# Patient Record
Sex: FEMALE | Race: WHITE | Employment: FULL TIME | URBAN - METROPOLITAN AREA
[De-identification: names, ages, dates, MRNs, and addresses within clinical notes are randomized per-mention and may not be internally consistent; named-entity substitution may affect disease eponyms.]

---

## 2019-07-09 ENCOUNTER — HOSPITAL ENCOUNTER (OUTPATIENT)
Dept: PREADMISSION TESTING | Age: 44
Discharge: HOME | End: 2019-07-09
Payer: COMMERCIAL

## 2019-07-09 ENCOUNTER — HOSPITAL ENCOUNTER (OUTPATIENT)
Dept: GENERAL RADIOLOGY | Age: 44
Discharge: HOME | End: 2019-07-09
Attending: OTOLARYNGOLOGY
Payer: COMMERCIAL

## 2019-07-09 VITALS
WEIGHT: 274.5 LBS | RESPIRATION RATE: 20 BRPM | DIASTOLIC BLOOD PRESSURE: 70 MMHG | SYSTOLIC BLOOD PRESSURE: 128 MMHG | HEART RATE: 90 BPM | HEIGHT: 68 IN | BODY MASS INDEX: 41.6 KG/M2 | TEMPERATURE: 98 F

## 2019-07-09 DIAGNOSIS — E11.65 UNCONTROLLED TYPE 2 DIABETES MELLITUS WITH HYPERGLYCEMIA (HCC): ICD-10-CM

## 2019-07-09 LAB
ALBUMIN SERPL-MCNC: 3.2 G/DL (ref 3.5–5)
ALBUMIN/GLOB SERPL: 1.3 {RATIO} (ref 1.1–2.2)
ALP SERPL-CCNC: 119 U/L (ref 45–117)
ALT SERPL-CCNC: 17 U/L (ref 12–78)
ANION GAP SERPL CALC-SCNC: 9 MMOL/L (ref 5–15)
APPEARANCE UR: CLEAR
AST SERPL-CCNC: 7 U/L (ref 15–37)
ATRIAL RATE: 86 BPM
BACTERIA URNS QL MICRO: NEGATIVE /HPF
BASOPHILS # BLD: 0.1 K/UL (ref 0–0.1)
BASOPHILS NFR BLD: 1 % (ref 0–1)
BILIRUB SERPL-MCNC: 0.3 MG/DL (ref 0.2–1)
BILIRUB UR QL: NEGATIVE
BUN SERPL-MCNC: 15 MG/DL (ref 6–20)
BUN/CREAT SERPL: 14 (ref 12–20)
CALCIUM SERPL-MCNC: 8.2 MG/DL (ref 8.5–10.1)
CALCULATED P AXIS, ECG09: 61 DEGREES
CALCULATED R AXIS, ECG10: -27 DEGREES
CALCULATED T AXIS, ECG11: 4 DEGREES
CHLORIDE SERPL-SCNC: 105 MMOL/L (ref 97–108)
CO2 SERPL-SCNC: 23 MMOL/L (ref 21–32)
COLOR UR: ABNORMAL
CREAT SERPL-MCNC: 1.08 MG/DL (ref 0.55–1.02)
DIAGNOSIS, 93000: NORMAL
DIFFERENTIAL METHOD BLD: ABNORMAL
EOSINOPHIL # BLD: 0.2 K/UL (ref 0–0.4)
EOSINOPHIL NFR BLD: 4 % (ref 0–7)
EPITH CASTS URNS QL MICRO: ABNORMAL /LPF
ERYTHROCYTE [DISTWIDTH] IN BLOOD BY AUTOMATED COUNT: 15.4 % (ref 11.5–14.5)
EST. AVERAGE GLUCOSE BLD GHB EST-MCNC: 292 MG/DL
GLOBULIN SER CALC-MCNC: 2.5 G/DL (ref 2–4)
GLUCOSE SERPL-MCNC: 389 MG/DL (ref 65–100)
GLUCOSE UR STRIP.AUTO-MCNC: >1000 MG/DL
HBA1C MFR BLD: 11.8 % (ref 4.2–6.3)
HCT VFR BLD AUTO: 37.7 % (ref 35–47)
HGB BLD-MCNC: 11.4 G/DL (ref 11.5–16)
HGB UR QL STRIP: NEGATIVE
HYALINE CASTS URNS QL MICRO: ABNORMAL /LPF (ref 0–5)
IMM GRANULOCYTES # BLD AUTO: 0.1 K/UL (ref 0–0.04)
IMM GRANULOCYTES NFR BLD AUTO: 1 % (ref 0–0.5)
KETONES UR QL STRIP.AUTO: NEGATIVE MG/DL
LEUKOCYTE ESTERASE UR QL STRIP.AUTO: NEGATIVE
LYMPHOCYTES # BLD: 1.2 K/UL (ref 0.8–3.5)
LYMPHOCYTES NFR BLD: 23 % (ref 12–49)
MCH RBC QN AUTO: 21.6 PG (ref 26–34)
MCHC RBC AUTO-ENTMCNC: 30.2 G/DL (ref 30–36.5)
MCV RBC AUTO: 71.5 FL (ref 80–99)
MONOCYTES # BLD: 0.5 K/UL (ref 0–1)
MONOCYTES NFR BLD: 9 % (ref 5–13)
NEUTS SEG # BLD: 3.1 K/UL (ref 1.8–8)
NEUTS SEG NFR BLD: 62 % (ref 32–75)
NITRITE UR QL STRIP.AUTO: NEGATIVE
NRBC # BLD: 0 K/UL (ref 0–0.01)
NRBC BLD-RTO: 0 PER 100 WBC
P-R INTERVAL, ECG05: 162 MS
PH UR STRIP: 6 [PH] (ref 5–8)
PLATELET # BLD AUTO: 203 K/UL (ref 150–400)
PMV BLD AUTO: 11.2 FL (ref 8.9–12.9)
POTASSIUM SERPL-SCNC: 4 MMOL/L (ref 3.5–5.1)
PROT SERPL-MCNC: 5.7 G/DL (ref 6.4–8.2)
PROT UR STRIP-MCNC: NEGATIVE MG/DL
Q-T INTERVAL, ECG07: 390 MS
QRS DURATION, ECG06: 90 MS
QTC CALCULATION (BEZET), ECG08: 466 MS
RBC # BLD AUTO: 5.27 M/UL (ref 3.8–5.2)
RBC #/AREA URNS HPF: ABNORMAL /HPF (ref 0–5)
SODIUM SERPL-SCNC: 137 MMOL/L (ref 136–145)
SP GR UR REFRACTOMETRY: 1.01 (ref 1–1.03)
UA: UC IF INDICATED,UAUC: ABNORMAL
UROBILINOGEN UR QL STRIP.AUTO: 0.2 EU/DL (ref 0.2–1)
VENTRICULAR RATE, ECG03: 86 BPM
WBC # BLD AUTO: 5.1 K/UL (ref 3.6–11)
WBC URNS QL MICRO: ABNORMAL /HPF (ref 0–4)

## 2019-07-09 PROCEDURE — 93005 ELECTROCARDIOGRAM TRACING: CPT

## 2019-07-09 PROCEDURE — 83036 HEMOGLOBIN GLYCOSYLATED A1C: CPT

## 2019-07-09 PROCEDURE — 85025 COMPLETE CBC W/AUTO DIFF WBC: CPT

## 2019-07-09 PROCEDURE — 36415 COLL VENOUS BLD VENIPUNCTURE: CPT

## 2019-07-09 PROCEDURE — 81001 URINALYSIS AUTO W/SCOPE: CPT

## 2019-07-09 PROCEDURE — 80053 COMPREHEN METABOLIC PANEL: CPT

## 2019-07-09 PROCEDURE — 71046 X-RAY EXAM CHEST 2 VIEWS: CPT

## 2019-07-09 RX ORDER — ATENOLOL 25 MG/1
25 TABLET ORAL DAILY
COMMUNITY

## 2019-07-09 RX ORDER — FUROSEMIDE 20 MG/1
20 TABLET ORAL DAILY
COMMUNITY

## 2019-07-09 RX ORDER — POTASSIUM CHLORIDE 750 MG/1
10 CAPSULE, EXTENDED RELEASE ORAL DAILY
COMMUNITY

## 2019-07-10 PROBLEM — E11.65 UNCONTROLLED TYPE 2 DIABETES MELLITUS WITH HYPERGLYCEMIA (HCC): Status: ACTIVE | Noted: 2019-07-10

## 2019-07-10 NOTE — ADVANCED PRACTICE NURSE
Patient contacted in regard to A1C 11.8. Patient educated about A1C test and potential complications of uncontrolled blood glucose and surgery, including infection and difficulty healing. Informed also of risk of cancellation of elective surgery and better to have surgery when glucose control has improved. Provided lifestyle modification information. PAT labs routed to PCP and patient informed of need to f/u. Message left with Crystal of Dr Rigoberto Martinez team to inform, per Dr Fe Watson, that patient may be cancelled DOS d/t uncontrolled DM.

## 2019-07-16 ENCOUNTER — ANESTHESIA EVENT (OUTPATIENT)
Dept: MEDSURG UNIT | Age: 44
End: 2019-07-16
Payer: COMMERCIAL

## 2019-07-17 ENCOUNTER — ANESTHESIA (OUTPATIENT)
Dept: MEDSURG UNIT | Age: 44
End: 2019-07-17
Payer: COMMERCIAL

## 2019-07-17 ENCOUNTER — HOSPITAL ENCOUNTER (OUTPATIENT)
Age: 44
Setting detail: OUTPATIENT SURGERY
Discharge: HOME | End: 2019-07-17
Attending: OTOLARYNGOLOGY | Admitting: OTOLARYNGOLOGY
Payer: COMMERCIAL

## 2019-07-17 VITALS
TEMPERATURE: 97.6 F | OXYGEN SATURATION: 99 % | RESPIRATION RATE: 16 BRPM | SYSTOLIC BLOOD PRESSURE: 148 MMHG | HEART RATE: 78 BPM | DIASTOLIC BLOOD PRESSURE: 78 MMHG

## 2019-07-17 DIAGNOSIS — J32.0 CHRONIC MAXILLARY SINUSITIS: Primary | ICD-10-CM

## 2019-07-17 LAB
GLUCOSE BLD STRIP.AUTO-MCNC: 121 MG/DL (ref 65–100)
GLUCOSE BLD STRIP.AUTO-MCNC: 155 MG/DL (ref 65–100)
HCG UR QL: NEGATIVE
SERVICE CMNT-IMP: ABNORMAL
SERVICE CMNT-IMP: ABNORMAL

## 2019-07-17 PROCEDURE — 77030008684 HC TU ET CUF COVD -B: Performed by: ANESTHESIOLOGY

## 2019-07-17 PROCEDURE — 74011000250 HC RX REV CODE- 250

## 2019-07-17 PROCEDURE — 77030032490 HC SLV COMPR SCD KNE COVD -B: Performed by: OTOLARYNGOLOGY

## 2019-07-17 PROCEDURE — 77030018836 HC SOL IRR NACL ICUM -A: Performed by: OTOLARYNGOLOGY

## 2019-07-17 PROCEDURE — 81025 URINE PREGNANCY TEST: CPT

## 2019-07-17 PROCEDURE — 77030028681 HC DRSG NSL ABSRB NASOPORE STRY -C: Performed by: OTOLARYNGOLOGY

## 2019-07-17 PROCEDURE — 74011000250 HC RX REV CODE- 250: Performed by: OTOLARYNGOLOGY

## 2019-07-17 PROCEDURE — 77030002888 HC SUT CHRMC J&J -A: Performed by: OTOLARYNGOLOGY

## 2019-07-17 PROCEDURE — 76060000061 HC AMB SURG ANES 0.5 TO 1 HR: Performed by: OTOLARYNGOLOGY

## 2019-07-17 PROCEDURE — 74011250636 HC RX REV CODE- 250/636

## 2019-07-17 PROCEDURE — 76030000000 HC AMB SURG OR TIME 0.5 TO 1: Performed by: OTOLARYNGOLOGY

## 2019-07-17 PROCEDURE — 76210000037 HC AMBSU PH I REC 2 TO 2.5 HR: Performed by: OTOLARYNGOLOGY

## 2019-07-17 PROCEDURE — 77030021668 HC NEB PREFIL KT VYRM -A

## 2019-07-17 PROCEDURE — 82962 GLUCOSE BLOOD TEST: CPT

## 2019-07-17 PROCEDURE — 77030020256 HC SOL INJ NACL 0.9%  500ML: Performed by: OTOLARYNGOLOGY

## 2019-07-17 PROCEDURE — 76210000046 HC AMBSU PH II REC FIRST 0.5 HR: Performed by: OTOLARYNGOLOGY

## 2019-07-17 PROCEDURE — 77030020782 HC GWN BAIR PAWS FLX 3M -B

## 2019-07-17 PROCEDURE — 74011250637 HC RX REV CODE- 250/637: Performed by: OTOLARYNGOLOGY

## 2019-07-17 PROCEDURE — 74011250636 HC RX REV CODE- 250/636: Performed by: ANESTHESIOLOGY

## 2019-07-17 RX ORDER — FENTANYL CITRATE 50 UG/ML
INJECTION, SOLUTION INTRAMUSCULAR; INTRAVENOUS AS NEEDED
Status: DISCONTINUED | OUTPATIENT
Start: 2019-07-17 | End: 2019-07-17 | Stop reason: HOSPADM

## 2019-07-17 RX ORDER — SODIUM CHLORIDE 0.9 % (FLUSH) 0.9 %
5-40 SYRINGE (ML) INJECTION EVERY 8 HOURS
Status: DISCONTINUED | OUTPATIENT
Start: 2019-07-17 | End: 2019-07-17 | Stop reason: HOSPADM

## 2019-07-17 RX ORDER — OXYMETAZOLINE HCL 0.05 %
2 SPRAY, NON-AEROSOL (ML) NASAL
Status: DISCONTINUED | OUTPATIENT
Start: 2019-07-17 | End: 2019-07-17 | Stop reason: HOSPADM

## 2019-07-17 RX ORDER — FENTANYL CITRATE 50 UG/ML
25 INJECTION, SOLUTION INTRAMUSCULAR; INTRAVENOUS
Status: COMPLETED | OUTPATIENT
Start: 2019-07-17 | End: 2019-07-17

## 2019-07-17 RX ORDER — SODIUM CHLORIDE, SODIUM LACTATE, POTASSIUM CHLORIDE, CALCIUM CHLORIDE 600; 310; 30; 20 MG/100ML; MG/100ML; MG/100ML; MG/100ML
50 INJECTION, SOLUTION INTRAVENOUS CONTINUOUS
Status: DISCONTINUED | OUTPATIENT
Start: 2019-07-17 | End: 2019-07-17 | Stop reason: HOSPADM

## 2019-07-17 RX ORDER — MORPHINE SULFATE 10 MG/ML
2 INJECTION, SOLUTION INTRAMUSCULAR; INTRAVENOUS
Status: DISCONTINUED | OUTPATIENT
Start: 2019-07-17 | End: 2019-07-17 | Stop reason: HOSPADM

## 2019-07-17 RX ORDER — ACETAMINOPHEN 10 MG/ML
INJECTION, SOLUTION INTRAVENOUS AS NEEDED
Status: DISCONTINUED | OUTPATIENT
Start: 2019-07-17 | End: 2019-07-17 | Stop reason: HOSPADM

## 2019-07-17 RX ORDER — HYDROCODONE BITARTRATE AND ACETAMINOPHEN 5; 325 MG/1; MG/1
1 TABLET ORAL
Qty: 20 TAB | Refills: 0 | Status: SHIPPED | OUTPATIENT
Start: 2019-07-17 | End: 2019-07-22

## 2019-07-17 RX ORDER — CEPHALEXIN 500 MG/1
500 CAPSULE ORAL 3 TIMES DAILY
Qty: 21 CAP | Refills: 0 | Status: SHIPPED | OUTPATIENT
Start: 2019-07-17 | End: 2019-07-24

## 2019-07-17 RX ORDER — SODIUM CHLORIDE, SODIUM LACTATE, POTASSIUM CHLORIDE, CALCIUM CHLORIDE 600; 310; 30; 20 MG/100ML; MG/100ML; MG/100ML; MG/100ML
INJECTION, SOLUTION INTRAVENOUS
Status: DISCONTINUED | OUTPATIENT
Start: 2019-07-17 | End: 2019-07-17 | Stop reason: HOSPADM

## 2019-07-17 RX ORDER — MIDAZOLAM HYDROCHLORIDE 1 MG/ML
INJECTION, SOLUTION INTRAMUSCULAR; INTRAVENOUS AS NEEDED
Status: DISCONTINUED | OUTPATIENT
Start: 2019-07-17 | End: 2019-07-17 | Stop reason: HOSPADM

## 2019-07-17 RX ORDER — SUCCINYLCHOLINE CHLORIDE 20 MG/ML
INJECTION INTRAMUSCULAR; INTRAVENOUS AS NEEDED
Status: DISCONTINUED | OUTPATIENT
Start: 2019-07-17 | End: 2019-07-17 | Stop reason: HOSPADM

## 2019-07-17 RX ORDER — SODIUM CHLORIDE 0.9 % (FLUSH) 0.9 %
5-40 SYRINGE (ML) INJECTION AS NEEDED
Status: DISCONTINUED | OUTPATIENT
Start: 2019-07-17 | End: 2019-07-17 | Stop reason: HOSPADM

## 2019-07-17 RX ORDER — DEXMEDETOMIDINE HYDROCHLORIDE 4 UG/ML
INJECTION, SOLUTION INTRAVENOUS AS NEEDED
Status: DISCONTINUED | OUTPATIENT
Start: 2019-07-17 | End: 2019-07-17 | Stop reason: HOSPADM

## 2019-07-17 RX ORDER — ONDANSETRON 2 MG/ML
4 INJECTION INTRAMUSCULAR; INTRAVENOUS AS NEEDED
Status: DISCONTINUED | OUTPATIENT
Start: 2019-07-17 | End: 2019-07-17 | Stop reason: HOSPADM

## 2019-07-17 RX ORDER — OXYCODONE AND ACETAMINOPHEN 5; 325 MG/1; MG/1
1 TABLET ORAL
Qty: 20 TAB | Refills: 0 | Status: SHIPPED | OUTPATIENT
Start: 2019-07-17 | End: 2019-07-22

## 2019-07-17 RX ORDER — ONDANSETRON 4 MG/1
4 TABLET, ORALLY DISINTEGRATING ORAL
Qty: 8 TAB | Refills: 0 | Status: SHIPPED | OUTPATIENT
Start: 2019-07-17

## 2019-07-17 RX ORDER — LIDOCAINE HYDROCHLORIDE AND EPINEPHRINE 10; 10 MG/ML; UG/ML
INJECTION, SOLUTION INFILTRATION; PERINEURAL AS NEEDED
Status: DISCONTINUED | OUTPATIENT
Start: 2019-07-17 | End: 2019-07-17 | Stop reason: HOSPADM

## 2019-07-17 RX ORDER — PROPOFOL 10 MG/ML
INJECTION, EMULSION INTRAVENOUS AS NEEDED
Status: DISCONTINUED | OUTPATIENT
Start: 2019-07-17 | End: 2019-07-17 | Stop reason: HOSPADM

## 2019-07-17 RX ORDER — CEFAZOLIN SODIUM 1 G/3ML
INJECTION, POWDER, FOR SOLUTION INTRAMUSCULAR; INTRAVENOUS AS NEEDED
Status: DISCONTINUED | OUTPATIENT
Start: 2019-07-17 | End: 2019-07-17 | Stop reason: HOSPADM

## 2019-07-17 RX ORDER — HYDROMORPHONE HYDROCHLORIDE 1 MG/ML
0.2 INJECTION, SOLUTION INTRAMUSCULAR; INTRAVENOUS; SUBCUTANEOUS
Status: DISCONTINUED | OUTPATIENT
Start: 2019-07-17 | End: 2019-07-17 | Stop reason: HOSPADM

## 2019-07-17 RX ORDER — LIDOCAINE HYDROCHLORIDE 20 MG/ML
INJECTION, SOLUTION EPIDURAL; INFILTRATION; INTRACAUDAL; PERINEURAL AS NEEDED
Status: DISCONTINUED | OUTPATIENT
Start: 2019-07-17 | End: 2019-07-17 | Stop reason: HOSPADM

## 2019-07-17 RX ORDER — EPHEDRINE SULFATE/0.9% NACL/PF 50 MG/5 ML
SYRINGE (ML) INTRAVENOUS AS NEEDED
Status: DISCONTINUED | OUTPATIENT
Start: 2019-07-17 | End: 2019-07-17 | Stop reason: HOSPADM

## 2019-07-17 RX ORDER — ONDANSETRON 2 MG/ML
INJECTION INTRAMUSCULAR; INTRAVENOUS AS NEEDED
Status: DISCONTINUED | OUTPATIENT
Start: 2019-07-17 | End: 2019-07-17 | Stop reason: HOSPADM

## 2019-07-17 RX ORDER — LIDOCAINE HYDROCHLORIDE 10 MG/ML
0.1 INJECTION, SOLUTION EPIDURAL; INFILTRATION; INTRACAUDAL; PERINEURAL AS NEEDED
Status: DISCONTINUED | OUTPATIENT
Start: 2019-07-17 | End: 2019-07-17 | Stop reason: HOSPADM

## 2019-07-17 RX ORDER — ROCURONIUM BROMIDE 10 MG/ML
INJECTION, SOLUTION INTRAVENOUS AS NEEDED
Status: DISCONTINUED | OUTPATIENT
Start: 2019-07-17 | End: 2019-07-17 | Stop reason: HOSPADM

## 2019-07-17 RX ADMIN — DEXMEDETOMIDINE HYDROCHLORIDE 5 MCG: 4 INJECTION, SOLUTION INTRAVENOUS at 12:00

## 2019-07-17 RX ADMIN — CEFAZOLIN SODIUM 2 G: 1 INJECTION, POWDER, FOR SOLUTION INTRAMUSCULAR; INTRAVENOUS at 11:33

## 2019-07-17 RX ADMIN — Medication 10 MG: at 12:02

## 2019-07-17 RX ADMIN — OXYMETAZOLINE HYDROCHLORIDE 2 SPRAY: 0.05 SPRAY NASAL at 10:25

## 2019-07-17 RX ADMIN — COCAINE HYDROCHLORIDE 2 SPRAY: 40 SOLUTION TOPICAL at 10:42

## 2019-07-17 RX ADMIN — Medication 10 MG: at 11:51

## 2019-07-17 RX ADMIN — LIDOCAINE HYDROCHLORIDE 5 MG: 20 INJECTION, SOLUTION EPIDURAL; INFILTRATION; INTRACAUDAL; PERINEURAL at 11:22

## 2019-07-17 RX ADMIN — FENTANYL CITRATE 25 MCG: 50 INJECTION, SOLUTION INTRAMUSCULAR; INTRAVENOUS at 13:10

## 2019-07-17 RX ADMIN — SODIUM CHLORIDE, SODIUM LACTATE, POTASSIUM CHLORIDE, CALCIUM CHLORIDE: 600; 310; 30; 20 INJECTION, SOLUTION INTRAVENOUS at 11:13

## 2019-07-17 RX ADMIN — ONDANSETRON 4 MG: 2 INJECTION INTRAMUSCULAR; INTRAVENOUS at 12:11

## 2019-07-17 RX ADMIN — Medication 10 MG: at 11:43

## 2019-07-17 RX ADMIN — ROCURONIUM BROMIDE 5 MG: 10 INJECTION, SOLUTION INTRAVENOUS at 11:20

## 2019-07-17 RX ADMIN — DEXMEDETOMIDINE HYDROCHLORIDE 5 MCG: 4 INJECTION, SOLUTION INTRAVENOUS at 12:03

## 2019-07-17 RX ADMIN — ACETAMINOPHEN 650 MG: 10 INJECTION, SOLUTION INTRAVENOUS at 11:29

## 2019-07-17 RX ADMIN — SODIUM CHLORIDE, SODIUM LACTATE, POTASSIUM CHLORIDE, AND CALCIUM CHLORIDE 50 ML/HR: 600; 310; 30; 20 INJECTION, SOLUTION INTRAVENOUS at 10:36

## 2019-07-17 RX ADMIN — DEXMEDETOMIDINE HYDROCHLORIDE 5 MCG: 4 INJECTION, SOLUTION INTRAVENOUS at 11:46

## 2019-07-17 RX ADMIN — MIDAZOLAM HYDROCHLORIDE 2 MG: 1 INJECTION, SOLUTION INTRAMUSCULAR; INTRAVENOUS at 11:13

## 2019-07-17 RX ADMIN — FENTANYL CITRATE 25 MCG: 50 INJECTION, SOLUTION INTRAMUSCULAR; INTRAVENOUS at 12:30

## 2019-07-17 RX ADMIN — SUCCINYLCHOLINE CHLORIDE 120 MG: 20 INJECTION INTRAMUSCULAR; INTRAVENOUS at 11:23

## 2019-07-17 RX ADMIN — PROPOFOL 30 MG: 10 INJECTION, EMULSION INTRAVENOUS at 11:13

## 2019-07-17 RX ADMIN — FENTANYL CITRATE 25 MCG: 50 INJECTION, SOLUTION INTRAMUSCULAR; INTRAVENOUS at 13:30

## 2019-07-17 RX ADMIN — ONDANSETRON 4 MG: 2 INJECTION INTRAMUSCULAR; INTRAVENOUS at 13:42

## 2019-07-17 RX ADMIN — FENTANYL CITRATE 25 MCG: 50 INJECTION, SOLUTION INTRAMUSCULAR; INTRAVENOUS at 13:00

## 2019-07-17 RX ADMIN — FENTANYL CITRATE 100 MCG: 50 INJECTION, SOLUTION INTRAMUSCULAR; INTRAVENOUS at 11:20

## 2019-07-17 RX ADMIN — PROPOFOL 120 MG: 10 INJECTION, EMULSION INTRAVENOUS at 11:22

## 2019-07-17 NOTE — DISCHARGE INSTRUCTIONS
[address] [place] [address]    The following instructions are designed to help you recover from surgery as easily as possible. Taking care of yourself can prevent complications. It is very important that you read this sheet often and follow the instructions carefully while you are at home. Your doctor or nurse will be happy to answer any questions. DIET:    You may resume your normal diet. It is important to remember that good overall diet and health promotes healing. ACTIVITY RESTRICTIONS:    The following guidelines should be followed until your doctor tells you otherwise:    Do not lift anything over five pounds. Do not bend over. Avoid doing things like tying your shoes or picking things up off the floor. Do not do heavy exercise or play contact sports. Do not strain for a bowel movement. If you are constipated, take a stool softener or a gentle laxative. Go back to work or school only when your doctor says you can. Do not blow your nose and if you have to sneeze, sneeze with your mouth open. HOW TO TAKE CARE OF YOUR NOSE AND SINUSES:    You may have some bloody thick mucus drainage from the nose. It will gradually go away. Applying a small gauze dressing beneath your nose will help absorb drainage. After a few days you will probably not need to use the dressing any longer. If you have a bloody saturated gauze more than once an hour, call the office. You may have some swelling of your nose, upper lip, cheeks or around your eyes for several days after surgery. This swelling will gradually go away. You can help to reduce it by sleeping with your head elevated on two or three pillows and by staying in an upright position as much as possible during your waking hours. Avoid smoke, dust, fumes or anything else that might irritate your nose.     Protect yourself from any unexpected injury to your nose or face, such as being hit by small children or a restless bedmate. Do not put anything into your nose. This includes your fingers, cotton-tipped applicators, tissues or handkerchiefs. Any of these items might accidentally injure your nose. You may find that a cool mist room humidifier is helpful in decreasing the amount of dryness in your nose and mouth. After your surgery you should use a nasal spray such as [place] or [employer]. Use 4 sprays in each nostril every two hours while awake to help prevent crusts from forming in your nose. MEDICINES TO AVOID:     DO NOT TAKE ANY ASPIRIN-CONTAINING MEDICATIONS OR IBUPROFEN MEDICINES (SUCH AS ADVIL OR NUPRIN). These medications can cause an increase in bleeding. If you think you may be taking a medicine that contains aspirin, ask your pharmacist.    RETURN APPOINTMENT:    You will have a follow-up appointment with your doctor. At this time your nose will be gently and carefully examined and any crusts that have formed will be removed. The removal of crusts may be somewhat uncomfortable for you since your nose is likely to still be tender from the surgery. Because of this, the doctor will spray your nose with special numbing medicine before removing the crusts. NOTIFY YOUR DOCTOR IF YOU HAVE:    A sudden increase in the amount of bleeding from the nose. A fever above 101 degrees F, which persists despite increasing the amount of fluids you take. A person with fever should try to drink approximately one cup of fluid each waking hour. Persistent sharp pain that is not relieved by the pain medication you receive. Increased swelling or redness of your nose. If you have any questions or concerns following your surgery do not hesitate to contact our office at     [address] [place] [address] office hours are 8:00 a.m. to 4:30 p.m. You should be able to reach us after hours by calling the regular office number.   If for some reason you are not able to reach our [address] [place] [address] service through this main number you may call them directly at [phone]. DISCHARGE SUMMARY from Nurse    You received 650 mg of IV tylenol (acetaminophen) during your procedure today at 11:30 AM. Please do not have any additional tylenol (acetaminophen) or tylenol containing products (Norco) for 4 hours, or no sooner than 3:30 PM.     PATIENT INSTRUCTIONS:    After general anesthesia or intravenous sedation, for 24 hours or while taking prescription Narcotics:  · Limit your activities  · Do not drive and operate hazardous machinery  · Do not make important personal or business decisions  · Do  not drink alcoholic beverages  · If you have not urinated within 8 hours after discharge, please contact your surgeon on call. Report the following to your surgeon:  · Excessive pain, swelling, redness or odor of or around the surgical area  · Temperature over 101. · Nausea and vomiting lasting longer than 4 hours or if unable to take medications  · Any signs of decreased circulation or nerve impairment to extremity: change in color, persistent  numbness, tingling, coldness or increase pain  · Any questions  If you experience any of the following symptoms as noted above, please follow up with Dr. Evgeny Oswald. What to do at Home:  Recommended Activity: See surgical instructions above from Dr. Evgeny Oswald. Recommended Diet: Resume regular diet as tolerated. Nothing heavy, greasy, fatty, or fried. Please make sure you have food on your stomach prior to taking narcotic pain medication. *  Please give a list of your current medications to your Primary Care Provider. *  Please update this list whenever your medications are discontinued, doses are changed, or new medications (including over-the-counter products) are added. *  Please carry medication information at all times in case of emergency situations.     Community Education:    These are general instructions for a healthy lifestyle:    No smoking/ No tobacco products/ Avoid exposure to second hand smoke. Surgeon General's Warning:  Quitting smoking now greatly reduces serious risk to your health. Obesity, smoking, and sedentary lifestyle greatly increases your risk for illness    A healthy diet, regular physical exercise & weight monitoring are important for maintaining a healthy lifestyle    You may be retaining fluid if you have a history of heart failure or if you experience any of the following symptoms:  Weight gain of 3 pounds or more overnight or 5 pounds in a week, increased swelling in our hands or feet or shortness of breath while lying flat in bed. Please call your doctor as soon as you notice any of these symptoms; do not wait until your next office visit. The discharge information has been reviewed with the patient and caregiver. The patient and caregiver verbalized understanding. Discharge medications reviewed with the patient and caregiver and appropriate educational materials and side effects teaching were provided.   ___________________________________________________________________________________________________________________________________

## 2019-07-17 NOTE — ANESTHESIA PREPROCEDURE EVALUATION
Relevant Problems   No relevant active problems       Anesthetic History   No history of anesthetic complications            Review of Systems / Medical History  Patient summary reviewed, nursing notes reviewed and pertinent labs reviewed    Pulmonary                   Neuro/Psych              Cardiovascular    Hypertension              Exercise tolerance: >4 METS     GI/Hepatic/Renal                Endo/Other    Diabetes: poorly controlled, using insulin    Morbid obesity     Other Findings   Comments:          Physical Exam    Airway  Mallampati: II  TM Distance: > 6 cm  Neck ROM: normal range of motion   Mouth opening: Normal     Cardiovascular    Rhythm: regular  Rate: normal         Dental  No notable dental hx       Pulmonary  Breath sounds clear to auscultation               Abdominal         Other Findings            Anesthetic Plan    ASA: 3  Anesthesia type: general          Induction: Intravenous  Anesthetic plan and risks discussed with: Patient

## 2019-07-17 NOTE — ANESTHESIA POSTPROCEDURE EVALUATION
Post-Anesthesia Evaluation and Assessment    Patient: Juan Burleson MRN: [id number]  SSN: [id number]    YOB: 1975  Age: 38 y.o. Sex: female      I have evaluated the patient and they are stable and ready for discharge from the PACU. Cardiovascular Function/Vital Signs  Visit Vitals  /58   Pulse 82   Temp 36.6 °C (97.8 °F)   Resp 12   SpO2 98%       Patient is status post General anesthesia for Procedure(s):  LEFT ENDOSCOPIC SINUS SURGERY (PARTIAL ETHMOID, MAXILLARY WITH TISSUE REMOVAL FRONTAL WITH BALLON). Nausea/Vomiting: None    Postoperative hydration reviewed and adequate. Pain:  Pain Scale 1: Numeric (0 - 10) (07/17/19 1330)  Pain Intensity 1: 4 (07/17/19 1330)   Managed    Neurological Status:   Neuro (WDL): Within Defined Limits (07/17/19 1330)  Neuro  Neurologic State: Sleeping (07/17/19 1330)  Orientation Level: Oriented to place;Oriented to person;Oriented to situation (07/17/19 1330)  LUE Motor Response: Purposeful (07/17/19 1330)  LLE Motor Response: Purposeful (07/17/19 1330)  RUE Motor Response: Purposeful (07/17/19 1330)  RLE Motor Response: Purposeful (07/17/19 1330)   At baseline    Mental Status, Level of Consciousness: Alert and  oriented to person, place, and time    Pulmonary Status:   O2 Device: Room air (07/17/19 1300)   Adequate oxygenation and airway patent    Complications related to anesthesia: None    Post-anesthesia assessment completed. No concerns    Signed By: Berry Love MD     July 17, 2019              Procedure(s):  LEFT ENDOSCOPIC SINUS SURGERY (PARTIAL ETHMOID, MAXILLARY WITH TISSUE REMOVAL FRONTAL WITH BALLON). general    <BSHSIANPOST>    Vitals Value Taken Time   /65 7/17/2019  1:30 PM   Temp 36.6 °C (97.8 °F) 7/17/2019 12:19 PM   Pulse 75 7/17/2019  1:45 PM   Resp 17 7/17/2019  1:45 PM   SpO2 97 % 7/17/2019  1:45 PM   Vitals shown include unvalidated device data.

## 2019-07-17 NOTE — BRIEF OP NOTE
BRIEF OPERATIVE NOTE    Date of Procedure: 7/17/2019   Preoperative Diagnosis: CHRONIC SINUSITITS  Postoperative Diagnosis: CHRONIC SINUSITITS    Procedure(s):  LEFT ENDOSCOPIC SINUS SURGERY ; PARTIAL ETHMOID, MAXILLARY WITH TISSUE REMOVAL, FRONTAL SINUSOTOMY  Surgeon(s) and Role:     * Sherren Gowers, MD - Primary         Surgical Assistant: none    Surgical Staff:  Circ-1: Yvette Real RN  Scrub RN-1: Dano José RN  Event Time In Time Out   Incision Start 1135    Incision Close 1208      Anesthesia: General   Estimated Blood Loss: 10cc  Specimens: * No specimens in log *   Findings: chronic sinusits   Complications: none  Implants: * No implants in log *

## 2019-07-17 NOTE — ROUTINE PROCESS
Patient: Giorgio Alegria MRN: [id number]  SSN: [id number]   YOB: 1975  Age: 38 y.o. Sex: female     Patient is status post Procedure(s):  LEFT ENDOSCOPIC SINUS SURGERY (PARTIAL ETHMOID, MAXILLARY WITH TISSUE REMOVAL FRONTAL WITH BALLON).     Surgeon(s) and Role:     * Raoul Freeman MD - Primary    Local/Dose/Irrigation:  SEE MAR                  Peripheral IV 07/17/19 Right Hand (Active)   Site Assessment Clean, dry, & intact 7/17/2019 10:35 AM   Dressing Status Clean, dry, & intact 7/17/2019 10:35 AM   Dressing Type Transparent 7/17/2019 10:35 AM            Airway - Endotracheal Tube 07/17/19 Oral (Active)                   Dressing/Packing:  Wound Nose-Dressing Type: 4 x 4;Special tape (comment)(MICROPORE TAPE) (07/17/19 2539)    Splint/Cast:  ]    Other:

## 2019-07-18 NOTE — OP NOTES
[address] [facility]  OPERATIVE REPORT    Name:  Araceli Tovar  MR#:  [id number]  :  1975  ACCOUNT #:  [de-identified]  DATE OF SERVICE:  2019      PREOPERATIVE DIAGNOSIS:  Chronic sinusitis. POSTOPERATIVE DIAGNOSIS:  Chronic sinusitis. PROCEDURE PERFORMED:  Left endoscopic frontal sinusotomy with anterior ethmoidectomy and middle meatal antrostomy, and removal of tissue from maxillary sinus. SURGEON:  Flora Barnes MD    ASSISTANT:  None. ANESTHESIA:  General endotracheal anesthesia, Dr. Cristina Pulido. COMPLICATIONS:  No complication. SPECIMENS REMOVED:  No specimen. IMPLANTS:  No implant. ESTIMATED BLOOD LOSS:  10 mL. HISTORY AND INDICATION:  The patient is a 50-year-old female with a history of diabetes. She has had a problem with chronic sinusitis. CT scan demonstrates disease at the maxillary, anterior ethmoid, frontal sinuses on the left side. She is now brought to the operating room for surgery. PROCEDURE IN DETAIL:  The patient was brought to the operating room and placed upon the operating room table in the supine position. After induction of general endotracheal anesthesia, the nose having been prepared with Afrin and cocaine sprays preoperatively with further prepared with Afrin and cocaine pledgets. The patient was draped in the usual fashion and examined endoscopically. The lateral nasal wall on the left side and the left middle turbinate were injected with 1% lidocaine with 1:100,000 epinephrine solution, a total of 3 mL of injection was used. The middle meatus was examined. The uncinate process was identified. A seeker was placed in the hiatus semilunaris and the uncinate process was retracted anteriorly. It was then removed with Straightshot microdebrider. The anterior ethmoids were entered. The bulla ethmoidalis was taken down. Inflammatory tissue was removed.   The natural ostium of the maxillary sinus was identified and middle meatal antrostomy was performed with the microdebrider. Thickened mucosa was removed from within the maxillary sinus. The agger nasi cells were then opened and with a 30-degree scope, the frontal ethmoid recess was identified. The nasofrontal duct cannot be identified directly. Therefore, a sheath was used to palpate some bone resolute with the upbiting forceps and the os was identified. The dilatation device was then passed into the os and advanced into the frontal sinus. This was confirmed by transillumination of the forehead. The dilatation device was then placed into the nasofrontal duct and the frontal duct was dilated twice. This was removed. Once this completed, again the 30-degree scope was used to visualize the area, and thickened mucosa was removed. Once this was completed, the entire cavity was inspected. There was no gross residual disease, and NasoPore and a pledget was placed in the left ethmoid bed. Moist sterile dressing was applied and the procedure was terminated. The patient having tolerated this well, was awakened from anesthesia and transported to the PACU in stable condition.         MD VINOD Avery/[email or web address]  D:  07/17/2019 12:18  T:  07/17/2019 13:44  JOB #:  [id number]  CC:  Satish Mayers MD

## 2021-08-17 ENCOUNTER — TRANSCRIBE ORDER (OUTPATIENT)
Dept: SCHEDULING | Age: 46
End: 2021-08-17

## 2021-08-17 DIAGNOSIS — Z12.31 BREAST CANCER SCREENING BY MAMMOGRAM: Primary | ICD-10-CM

## 2022-03-20 PROBLEM — E11.65 UNCONTROLLED TYPE 2 DIABETES MELLITUS WITH HYPERGLYCEMIA (HCC): Status: ACTIVE | Noted: 2019-07-10

## 2023-03-19 ENCOUNTER — HOSPITAL ENCOUNTER (EMERGENCY)
Age: 48
Discharge: HOME | End: 2023-03-19
Attending: EMERGENCY MEDICINE
Payer: COMMERCIAL

## 2023-03-19 VITALS
HEART RATE: 101 BPM | OXYGEN SATURATION: 99 % | RESPIRATION RATE: 18 BRPM | BODY MASS INDEX: 37.89 KG/M2 | WEIGHT: 250 LBS | SYSTOLIC BLOOD PRESSURE: 132 MMHG | HEIGHT: 68 IN | TEMPERATURE: 97.9 F | DIASTOLIC BLOOD PRESSURE: 69 MMHG

## 2023-03-19 DIAGNOSIS — J20.9 ACUTE BRONCHITIS, UNSPECIFIED ORGANISM: Primary | ICD-10-CM

## 2023-03-19 DIAGNOSIS — R05.1 ACUTE COUGH: ICD-10-CM

## 2023-03-19 DIAGNOSIS — R06.2 WHEEZING: ICD-10-CM

## 2023-03-19 PROCEDURE — 99283 EMERGENCY DEPT VISIT LOW MDM: CPT

## 2023-03-19 RX ORDER — ALBUTEROL SULFATE 90 UG/1
2 AEROSOL, METERED RESPIRATORY (INHALATION)
Qty: 1 EACH | Refills: 0 | Status: SHIPPED | OUTPATIENT
Start: 2023-03-19

## 2023-03-19 RX ORDER — PREDNISONE 20 MG/1
40 TABLET ORAL DAILY
Qty: 10 TABLET | Refills: 0 | Status: SHIPPED | OUTPATIENT
Start: 2023-03-19 | End: 2023-03-24

## 2023-03-19 RX ORDER — GUAIFENESIN 600 MG/1
600 TABLET, EXTENDED RELEASE ORAL 2 TIMES DAILY
Qty: 10 TABLET | Refills: 0 | Status: SHIPPED | OUTPATIENT
Start: 2023-03-19 | End: 2023-03-24

## 2023-03-19 NOTE — ED TRIAGE NOTES
Pt c/o generalized weakness x several days, endorses productive cough,  head congestion, and SOB with movement, denies fevers/n/v/d

## 2023-03-19 NOTE — ED PROVIDER NOTES
60-year-old female presenting from home with complaint of cough and chest congestion. Symptoms started a few days ago. She reports some generalized body aches as well as bilateral rib pain with her cough. She had some wheezing and shortness of breath. No fevers at home. Denies any vomiting or diarrhea. No chest pain. States has been using NyQuil with little relief of her symptoms. Past medical history significant for diabetes, hypertension, PCOS, PE. Patient currently on Eliquis. Does not smoke cigarettes and denies any history of chronic lung disease.        Past Medical History:   Diagnosis Date    Diabetes ([address]     Hypertension     Ill-defined condition     PCOS    PCOS (polycystic ovarian syndrome)     Thromboembolus ([address]     2017 superficial varicose veins       Past Surgical History:   Procedure Laterality Date    HX  SECTION  2003    HX HEENT Bilateral 1980    LAZY EYE    HX HEENT  [id number]    18 SETS OF EAR TUBES    HX LUMBAR DISKECTOMY      HX TONSILLECTOMY           Family History:   Problem Relation Age of Onset    Diabetes Mother     Heart Attack Mother     Stroke Mother     Heart Surgery Mother     OSTEOARTHRITIS Mother     Kidney Disease Father     Other Brother         STILLBORN    Anesth Problems Neg Hx        Social History     Socioeconomic History    Marital status: SINGLE     Spouse name: Not on file    Number of children: Not on file    Years of education: Not on file    Highest education level: Not on file   Occupational History    Not on file   Tobacco Use    Smoking status: Never    Smokeless tobacco: Never   Substance and Sexual Activity    Alcohol use: No    Drug use: Never    Sexual activity: Not on file   Other Topics Concern    Not on file   Social History Narrative    Not on file     Social Determinants of Health     Financial Resource Strain: Not on file   Food Insecurity: Not on file   Transportation Needs: Not on file   Physical Activity: Not on file   Stress: Not on file   Social Connections: Not on file   Intimate Partner Violence: Not on file   Housing Stability: Not on file         ALLERGIES: Percocet [oxycodone-acetaminophen]    Review of Systems   Constitutional:  Negative for fever. HENT:  Negative for facial swelling. Eyes:  Negative for visual disturbance. Respiratory:  Negative for chest tightness. Cardiovascular:  Negative for chest pain. Gastrointestinal:  Negative for abdominal pain. Genitourinary:  Negative for difficulty urinating and dysuria. Musculoskeletal:  Negative for arthralgias. Skin:  Negative for rash. Neurological:  Negative for headaches. Hematological:  Negative for adenopathy. Psychiatric/Behavioral:  Negative for suicidal ideas. Vitals:    03/19/23 0944   BP: 132/69   Pulse: (!) 101   Resp: 18   Temp: 97.9 °F (36.6 °C)   SpO2: 99%   Weight: 113.4 kg (250 lb)   Height: 5' 8\" (1.727 m)            Physical Exam  Vitals and nursing note reviewed. Constitutional:       General: She is not in acute distress. Appearance: She is well-developed. HENT:      Head: Normocephalic and atraumatic. Eyes:      General: No scleral icterus. Conjunctiva/sclera: Conjunctivae normal.      Pupils: Pupils are equal, round, and reactive to light. Cardiovascular:      Rate and Rhythm: Normal rate. Heart sounds: No murmur heard. Pulmonary:      Effort: Pulmonary effort is normal. No respiratory distress. Breath sounds: Wheezing and rhonchi present. Abdominal:      General: There is no distension. Musculoskeletal:         General: Normal range of motion. Cervical back: Normal range of motion and neck supple. Skin:     General: Skin is warm and dry. Findings: No rash. Neurological:      Mental Status: She is alert and oriented to person, place, and time. Medical Decision Making  Assessment: Patient here presenting with cough and chest congestion.   She has expiratory wheezing on exam.  No respiratory distress. Vital signs otherwise unremarkable with no hypoxia. Symptoms are consistent with a viral bronchitis. I do not see any indication for imaging at this time as her lung sounds are otherwise equal and she has no fever or hypoxia. I also see no indication for antibiotics. Patient stable for discharge home. Joe treat with albuterol inhaler, steroids, Mucinex. She can continue her other medication as prescribed. She can follow-up with a primary care doctor next week if she is not improving. She was also advised to return to the ER if she has any new or worsening symptoms. Risk  OTC drugs. Prescription drug management.            Procedures

## 2023-03-19 NOTE — ED NOTES
Pt given discharge instructions, patient education, 3 prescriptions and follow up information. Pt verbalizes understanding. All questions answered. Pt discharged to home in private vehicle, ambulatory. Pt A&Ox4, RA, pain controlled.

## 2023-04-05 ENCOUNTER — HOSPITAL ENCOUNTER (OUTPATIENT)
Age: 48
LOS: 1 days | End: 2023-04-05
Attending: EMERGENCY MEDICINE
Payer: COMMERCIAL

## 2023-04-05 DIAGNOSIS — N93.8 DUB (DYSFUNCTIONAL UTERINE BLEEDING): ICD-10-CM

## 2023-04-05 DIAGNOSIS — I26.99 PULMONARY EMBOLISM WITHOUT ACUTE COR PULMONALE, UNSPECIFIED CHRONICITY, UNSPECIFIED PULMONARY EMBOLISM TYPE (HCC): ICD-10-CM

## 2023-04-05 DIAGNOSIS — E11.65 HYPERGLYCEMIA DUE TO DIABETES MELLITUS (HCC): ICD-10-CM

## 2023-04-05 DIAGNOSIS — D62 ACUTE BLOOD LOSS ANEMIA: ICD-10-CM

## 2023-04-05 DIAGNOSIS — Z79.01 CHRONIC ANTICOAGULATION: ICD-10-CM

## 2023-04-05 DIAGNOSIS — D64.9 SYMPTOMATIC ANEMIA: Primary | ICD-10-CM

## 2023-04-05 LAB
ABO + RH BLD: NORMAL
ATRIAL RATE: 116 BPM
BASOPHILS # BLD: 0.1 K/UL (ref 0–0.1)
BASOPHILS NFR BLD: 1 % (ref 0–1)
BLD PROD TYP BPU: NORMAL
BLOOD GROUP ANTIBODIES SERPL: NORMAL
BPU ID: NORMAL
CALCULATED P AXIS, ECG09: 52 DEGREES
CALCULATED R AXIS, ECG10: -39 DEGREES
CALCULATED T AXIS, ECG11: 55 DEGREES
CROSSMATCH RESULT,%XM: NORMAL
DIAGNOSIS, 93000: NORMAL
DIFFERENTIAL METHOD BLD: ABNORMAL
EOSINOPHIL # BLD: 0.2 K/UL (ref 0–0.4)
EOSINOPHIL NFR BLD: 3 % (ref 0–7)
ERYTHROCYTE [DISTWIDTH] IN BLOOD BY AUTOMATED COUNT: 16 % (ref 11.5–14.5)
HCG SERPL-ACNC: <1 MIU/ML
HCT VFR BLD AUTO: 25.9 % (ref 35–47)
HGB BLD-MCNC: 7.4 G/DL (ref 11.5–16)
IMM GRANULOCYTES # BLD AUTO: 0.1 K/UL (ref 0–0.04)
IMM GRANULOCYTES NFR BLD AUTO: 2 % (ref 0–0.5)
INR PPP: 2 (ref 0.9–1.1)
LYMPHOCYTES # BLD: 1.6 K/UL (ref 0.8–3.5)
LYMPHOCYTES NFR BLD: 24 % (ref 12–49)
MCH RBC QN AUTO: 19.7 PG (ref 26–34)
MCHC RBC AUTO-ENTMCNC: 28.6 G/DL (ref 30–36.5)
MCV RBC AUTO: 68.9 FL (ref 80–99)
MONOCYTES # BLD: 0.5 K/UL (ref 0–1)
MONOCYTES NFR BLD: 7 % (ref 5–13)
NEUTS SEG # BLD: 4.2 K/UL (ref 1.8–8)
NEUTS SEG NFR BLD: 63 % (ref 32–75)
NRBC # BLD: 0 K/UL (ref 0–0.01)
NRBC BLD-RTO: 0 PER 100 WBC
P-R INTERVAL, ECG05: 166 MS
PLATELET # BLD AUTO: 278 K/UL (ref 150–400)
PMV BLD AUTO: 11.1 FL (ref 8.9–12.9)
PROTHROMBIN TIME: 22.3 SEC (ref 11.9–14.6)
Q-T INTERVAL, ECG07: 324 MS
QRS DURATION, ECG06: 96 MS
QTC CALCULATION (BEZET), ECG08: 450 MS
RBC # BLD AUTO: 3.76 M/UL (ref 3.8–5.2)
RBC MORPH BLD: ABNORMAL
SPECIMEN EXP DATE BLD: NORMAL
STATUS OF UNIT,%ST: NORMAL
UNIT DIVISION, %UDIV: 0
VENTRICULAR RATE, ECG03: 116 BPM
WBC # BLD AUTO: 6.7 K/UL (ref 3.6–11)

## 2023-04-05 PROCEDURE — 86920 COMPATIBILITY TEST SPIN: CPT

## 2023-04-05 PROCEDURE — 80053 COMPREHEN METABOLIC PANEL: CPT

## 2023-04-05 PROCEDURE — 84702 CHORIONIC GONADOTROPIN TEST: CPT

## 2023-04-05 PROCEDURE — 93005 ELECTROCARDIOGRAM TRACING: CPT

## 2023-04-05 PROCEDURE — 85025 COMPLETE CBC W/AUTO DIFF WBC: CPT

## 2023-04-05 PROCEDURE — 85610 PROTHROMBIN TIME: CPT

## 2023-04-05 PROCEDURE — 99285 EMERGENCY DEPT VISIT HI MDM: CPT

## 2023-04-05 PROCEDURE — 86900 BLOOD TYPING SEROLOGIC ABO: CPT

## 2023-04-05 PROCEDURE — 36415 COLL VENOUS BLD VENIPUNCTURE: CPT

## 2023-04-05 RX ORDER — LISINOPRIL 10 MG/1
TABLET ORAL DAILY
COMMUNITY

## 2023-04-05 RX ORDER — CARVEDILOL 12.5 MG/1
TABLET ORAL 2 TIMES DAILY WITH MEALS
COMMUNITY

## 2023-04-05 RX ORDER — SPIRONOLACTONE 25 MG/1
TABLET ORAL DAILY
COMMUNITY
End: 2023-04-06

## 2023-04-06 ENCOUNTER — APPOINTMENT (OUTPATIENT)
Dept: CT IMAGING | Age: 48
End: 2023-04-06
Attending: EMERGENCY MEDICINE
Payer: COMMERCIAL

## 2023-04-06 ENCOUNTER — APPOINTMENT (OUTPATIENT)
Dept: VASCULAR SURGERY | Age: 48
End: 2023-04-06
Attending: NURSE PRACTITIONER
Payer: COMMERCIAL

## 2023-04-06 ENCOUNTER — APPOINTMENT (OUTPATIENT)
Dept: ULTRASOUND IMAGING | Age: 48
End: 2023-04-06
Attending: EMERGENCY MEDICINE
Payer: COMMERCIAL

## 2023-04-06 VITALS
DIASTOLIC BLOOD PRESSURE: 63 MMHG | HEIGHT: 67 IN | OXYGEN SATURATION: 98 % | TEMPERATURE: 98.1 F | SYSTOLIC BLOOD PRESSURE: 111 MMHG | BODY MASS INDEX: 39.24 KG/M2 | WEIGHT: 250 LBS | RESPIRATION RATE: 16 BRPM | HEART RATE: 106 BPM

## 2023-04-06 PROBLEM — Z86.718 HX OF DEEP VENOUS THROMBOSIS: Status: ACTIVE | Noted: 2023-04-06

## 2023-04-06 PROBLEM — Z86.711 HX PULMONARY EMBOLISM: Status: ACTIVE | Noted: 2023-04-06

## 2023-04-06 PROBLEM — E87.1 HYPONATREMIA: Status: ACTIVE | Noted: 2023-04-06

## 2023-04-06 PROBLEM — I10 HYPERTENSION: Status: ACTIVE | Noted: 2023-04-06

## 2023-04-06 PROBLEM — I26.99 PULMONARY EMBOLI (HCC): Status: ACTIVE | Noted: 2023-04-06

## 2023-04-06 PROBLEM — E86.0 DEHYDRATION: Status: ACTIVE | Noted: 2023-04-06

## 2023-04-06 PROBLEM — R00.0 SINUS TACHYCARDIA: Status: ACTIVE | Noted: 2023-04-06

## 2023-04-06 PROBLEM — E66.9 OBESE: Status: ACTIVE | Noted: 2023-04-06

## 2023-04-06 PROBLEM — D64.9 ANEMIA: Status: ACTIVE | Noted: 2023-04-06

## 2023-04-06 PROBLEM — N17.9 AKI (ACUTE KIDNEY INJURY) (HCC): Status: ACTIVE | Noted: 2023-04-06

## 2023-04-06 PROBLEM — R91.8 PULMONARY NODULES: Status: ACTIVE | Noted: 2023-04-06

## 2023-04-06 PROBLEM — E11.9 DM (DIABETES MELLITUS) (HCC): Status: ACTIVE | Noted: 2023-04-06

## 2023-04-06 PROBLEM — N92.0 MENORRHAGIA: Status: ACTIVE | Noted: 2023-04-06

## 2023-04-06 PROBLEM — E28.2 PCOS (POLYCYSTIC OVARIAN SYNDROME): Status: ACTIVE | Noted: 2023-04-06

## 2023-04-06 LAB
ALBUMIN SERPL-MCNC: 3.5 G/DL (ref 3.5–5.2)
ALBUMIN/GLOB SERPL: 1.7 (ref 1.1–2.2)
ALP SERPL-CCNC: 107 U/L (ref 35–104)
ALT SERPL-CCNC: 14 U/L (ref 10–35)
ANION GAP SERPL CALC-SCNC: 10 MMOL/L (ref 5–15)
APPEARANCE UR: ABNORMAL
AST SERPL-CCNC: 9 U/L (ref 10–35)
ATRIAL RATE: 116 BPM
BACTERIA URNS QL MICRO: NEGATIVE /HPF
BASOPHILS # BLD: 0.1 K/UL (ref 0–0.1)
BASOPHILS NFR BLD: 1 % (ref 0–1)
BILIRUB SERPL-MCNC: 0.2 MG/DL (ref 0.2–1)
BILIRUB UR QL: NEGATIVE
BUN SERPL-MCNC: 17 MG/DL (ref 6–20)
BUN/CREAT SERPL: 14 (ref 12–20)
CALCIUM SERPL-MCNC: 8.6 MG/DL (ref 8.6–10)
CALCULATED P AXIS, ECG09: 52 DEGREES
CALCULATED R AXIS, ECG10: -39 DEGREES
CALCULATED T AXIS, ECG11: 55 DEGREES
CHLORIDE SERPL-SCNC: 98 MMOL/L (ref 98–107)
CO2 SERPL-SCNC: 27 MMOL/L (ref 22–29)
COLOR UR: ABNORMAL
COMMENT, HOLDF: NORMAL
CREAT SERPL-MCNC: 1.18 MG/DL (ref 0.5–0.9)
DIAGNOSIS, 93000: NORMAL
DIFFERENTIAL METHOD BLD: ABNORMAL
EOSINOPHIL # BLD: 0.2 K/UL (ref 0–0.4)
EOSINOPHIL NFR BLD: 3 % (ref 0–7)
EPITH CASTS URNS QL MICRO: ABNORMAL /LPF
ERYTHROCYTE [DISTWIDTH] IN BLOOD BY AUTOMATED COUNT: 16 % (ref 11.5–14.5)
ETHANOL SERPL-MCNC: <10 MG/DL
FERRITIN SERPL-MCNC: 4 NG/ML (ref 8–252)
FOLATE SERPL-MCNC: 11.5 NG/ML (ref 5–21)
GLOBULIN SER CALC-MCNC: 2.1 G/DL (ref 2–4)
GLUCOSE BLD STRIP.AUTO-MCNC: 301 MG/DL (ref 65–117)
GLUCOSE BLD STRIP.AUTO-MCNC: 414 MG/DL (ref 65–117)
GLUCOSE SERPL-MCNC: 567 MG/DL (ref 65–100)
GLUCOSE UR STRIP.AUTO-MCNC: >1000 MG/DL
HAPTOGLOB SERPL-MCNC: 236 MG/DL (ref 30–200)
HCG SERPL-ACNC: <1 MIU/ML
HCT VFR BLD AUTO: 25.9 % (ref 35–47)
HCT VFR BLD AUTO: 26 % (ref 35–47)
HCT VFR BLD AUTO: 27 % (ref 35–47)
HGB BLD-MCNC: 7.4 G/DL (ref 11.5–16)
HGB BLD-MCNC: 7.7 G/DL (ref 11.5–16)
HGB BLD-MCNC: 7.9 G/DL (ref 11.5–16)
HGB UR QL STRIP: ABNORMAL
HISTORY CHECKED?,CKHIST: NORMAL
HYALINE CASTS URNS QL MICRO: ABNORMAL /LPF
IMM GRANULOCYTES # BLD AUTO: 0.1 K/UL (ref 0–0.04)
IMM GRANULOCYTES NFR BLD AUTO: 2 % (ref 0–0.5)
IRON SATN MFR SERPL: 4 % (ref 20–50)
IRON SERPL-MCNC: 17 UG/DL (ref 35–150)
KETONES UR QL STRIP.AUTO: NEGATIVE MG/DL
LDH SERPL L TO P-CCNC: 187 U/L (ref 81–246)
LEUKOCYTE ESTERASE UR QL STRIP.AUTO: NEGATIVE
LYMPHOCYTES # BLD: 1.6 K/UL (ref 0.8–3.5)
LYMPHOCYTES NFR BLD: 24 % (ref 12–49)
MCH RBC QN AUTO: 19.7 PG (ref 26–34)
MCHC RBC AUTO-ENTMCNC: 28.6 G/DL (ref 30–36.5)
MCV RBC AUTO: 68.9 FL (ref 80–99)
MONOCYTES # BLD: 0.5 K/UL (ref 0–1)
MONOCYTES NFR BLD: 7 % (ref 5–13)
NEUTS SEG # BLD: 4.2 K/UL (ref 1.8–8)
NEUTS SEG NFR BLD: 63 % (ref 32–75)
NITRITE UR QL STRIP.AUTO: POSITIVE
NRBC # BLD: 0 K/UL (ref 0–0.01)
NRBC BLD-RTO: 0 PER 100 WBC
P-R INTERVAL, ECG05: 166 MS
PH UR STRIP: 6.5 (ref 5–8)
PLATELET # BLD AUTO: 278 K/UL (ref 150–400)
PMV BLD AUTO: 11.1 FL (ref 8.9–12.9)
POTASSIUM SERPL-SCNC: 4.8 MMOL/L (ref 3.5–5.1)
PROT SERPL-MCNC: 5.6 G/DL (ref 6.4–8.3)
PROT UR STRIP-MCNC: 100 MG/DL
Q-T INTERVAL, ECG07: 324 MS
QRS DURATION, ECG06: 96 MS
QTC CALCULATION (BEZET), ECG08: 450 MS
RBC # BLD AUTO: 3.76 M/UL (ref 3.8–5.2)
RBC #/AREA URNS HPF: >100 /HPF
RBC MORPH BLD: ABNORMAL
RETICS # AUTO: 0.09 M/UL (ref 0.02–0.08)
RETICS/RBC NFR AUTO: 2.3 % (ref 0.7–2.1)
SAMPLES BEING HELD,HOLD: NORMAL
SERVICE CMNT-IMP: ABNORMAL
SERVICE CMNT-IMP: ABNORMAL
SODIUM SERPL-SCNC: 135 MMOL/L (ref 136–145)
SP GR UR REFRACTOMETRY: 1.01 (ref 1–1.03)
TIBC SERPL-MCNC: 441 UG/DL (ref 250–450)
TSH SERPL DL<=0.05 MIU/L-ACNC: 3.35 UIU/ML (ref 0.36–3.74)
UROBILINOGEN UR QL STRIP.AUTO: 0.2 EU/DL (ref 0.2–1)
VENTRICULAR RATE, ECG03: 116 BPM
VIT B12 SERPL-MCNC: 387 PG/ML (ref 193–986)
WBC # BLD AUTO: 6.7 K/UL (ref 3.6–11)
WBC URNS QL MICRO: ABNORMAL /HPF (ref 0–4)

## 2023-04-06 PROCEDURE — 82962 GLUCOSE BLOOD TEST: CPT

## 2023-04-06 PROCEDURE — 93970 EXTREMITY STUDY: CPT

## 2023-04-06 PROCEDURE — 74011250636 HC RX REV CODE- 250/636: Performed by: INTERNAL MEDICINE

## 2023-04-06 PROCEDURE — 85045 AUTOMATED RETICULOCYTE COUNT: CPT

## 2023-04-06 PROCEDURE — 82728 ASSAY OF FERRITIN: CPT

## 2023-04-06 PROCEDURE — 81001 URINALYSIS AUTO W/SCOPE: CPT

## 2023-04-06 PROCEDURE — 84443 ASSAY THYROID STIM HORMONE: CPT

## 2023-04-06 PROCEDURE — 2709999900 HC NON-CHARGEABLE SUPPLY

## 2023-04-06 PROCEDURE — 83540 ASSAY OF IRON: CPT

## 2023-04-06 PROCEDURE — 96374 THER/PROPH/DIAG INJ IV PUSH: CPT

## 2023-04-06 PROCEDURE — 74177 CT ABD & PELVIS W/CONTRAST: CPT

## 2023-04-06 PROCEDURE — 99222 1ST HOSP IP/OBS MODERATE 55: CPT | Performed by: INTERNAL MEDICINE

## 2023-04-06 PROCEDURE — 71275 CT ANGIOGRAPHY CHEST: CPT

## 2023-04-06 PROCEDURE — 83010 ASSAY OF HAPTOGLOBIN QUANT: CPT

## 2023-04-06 PROCEDURE — 65270000029 HC RM PRIVATE

## 2023-04-06 PROCEDURE — 82077 ASSAY SPEC XCP UR&BREATH IA: CPT

## 2023-04-06 PROCEDURE — 85018 HEMOGLOBIN: CPT

## 2023-04-06 PROCEDURE — 74011000636 HC RX REV CODE- 636: Performed by: EMERGENCY MEDICINE

## 2023-04-06 PROCEDURE — 74011000258 HC RX REV CODE- 258: Performed by: INTERNAL MEDICINE

## 2023-04-06 PROCEDURE — 74011250637 HC RX REV CODE- 250/637: Performed by: INTERNAL MEDICINE

## 2023-04-06 PROCEDURE — 82607 VITAMIN B-12: CPT

## 2023-04-06 PROCEDURE — 82746 ASSAY OF FOLIC ACID SERUM: CPT

## 2023-04-06 PROCEDURE — 36415 COLL VENOUS BLD VENIPUNCTURE: CPT

## 2023-04-06 PROCEDURE — G0378 HOSPITAL OBSERVATION PER HR: HCPCS

## 2023-04-06 PROCEDURE — 74011636637 HC RX REV CODE- 636/637: Performed by: INTERNAL MEDICINE

## 2023-04-06 PROCEDURE — 36430 TRANSFUSION BLD/BLD COMPNT: CPT

## 2023-04-06 PROCEDURE — P9016 RBC LEUKOCYTES REDUCED: HCPCS

## 2023-04-06 PROCEDURE — 83615 LACTATE (LD) (LDH) ENZYME: CPT

## 2023-04-06 PROCEDURE — 76830 TRANSVAGINAL US NON-OB: CPT

## 2023-04-06 RX ORDER — SODIUM CHLORIDE 9 MG/ML
250 INJECTION, SOLUTION INTRAVENOUS AS NEEDED
Status: DISCONTINUED | OUTPATIENT
Start: 2023-04-06 | End: 2023-04-06 | Stop reason: HOSPADM

## 2023-04-06 RX ORDER — INSULIN LISPRO 100 [IU]/ML
INJECTION, SOLUTION INTRAVENOUS; SUBCUTANEOUS EVERY 6 HOURS
Status: DISCONTINUED | OUTPATIENT
Start: 2023-04-06 | End: 2023-04-06 | Stop reason: HOSPADM

## 2023-04-06 RX ORDER — ONDANSETRON 4 MG/1
4 TABLET, ORALLY DISINTEGRATING ORAL
Status: DISCONTINUED | OUTPATIENT
Start: 2023-04-06 | End: 2023-04-06 | Stop reason: HOSPADM

## 2023-04-06 RX ORDER — ATENOLOL 50 MG/1
25 TABLET ORAL DAILY
Status: DISCONTINUED | OUTPATIENT
Start: 2023-04-06 | End: 2023-04-06

## 2023-04-06 RX ORDER — SODIUM CHLORIDE 0.9 % (FLUSH) 0.9 %
5-40 SYRINGE (ML) INJECTION EVERY 8 HOURS
Status: DISCONTINUED | OUTPATIENT
Start: 2023-04-06 | End: 2023-04-06 | Stop reason: HOSPADM

## 2023-04-06 RX ORDER — ACETAMINOPHEN 325 MG/1
650 TABLET ORAL
Status: DISCONTINUED | OUTPATIENT
Start: 2023-04-06 | End: 2023-04-06 | Stop reason: HOSPADM

## 2023-04-06 RX ORDER — FERROUS GLUCONATE 324(38)MG
324 TABLET ORAL
Qty: 90 TABLET | Refills: 0 | Status: SHIPPED | OUTPATIENT
Start: 2023-04-06 | End: 2023-07-05

## 2023-04-06 RX ORDER — MEDROXYPROGESTERONE ACETATE 10 MG/1
20 TABLET ORAL 2 TIMES DAILY
Qty: 120 TABLET | Refills: 0 | Status: SHIPPED | OUTPATIENT
Start: 2023-04-06 | End: 2023-05-06

## 2023-04-06 RX ORDER — ONDANSETRON 2 MG/ML
4 INJECTION INTRAMUSCULAR; INTRAVENOUS
Status: DISCONTINUED | OUTPATIENT
Start: 2023-04-06 | End: 2023-04-06 | Stop reason: HOSPADM

## 2023-04-06 RX ORDER — SODIUM CHLORIDE 9 MG/ML
125 INJECTION, SOLUTION INTRAVENOUS CONTINUOUS
Status: DISCONTINUED | OUTPATIENT
Start: 2023-04-06 | End: 2023-04-06

## 2023-04-06 RX ORDER — MEDROXYPROGESTERONE ACETATE 10 MG/1
20 TABLET ORAL 2 TIMES DAILY
Status: DISCONTINUED | OUTPATIENT
Start: 2023-04-06 | End: 2023-04-06 | Stop reason: HOSPADM

## 2023-04-06 RX ORDER — IBUPROFEN 200 MG
4 TABLET ORAL AS NEEDED
Status: DISCONTINUED | OUTPATIENT
Start: 2023-04-06 | End: 2023-04-06 | Stop reason: HOSPADM

## 2023-04-06 RX ORDER — SODIUM CHLORIDE 0.9 % (FLUSH) 0.9 %
5-40 SYRINGE (ML) INJECTION AS NEEDED
Status: DISCONTINUED | OUTPATIENT
Start: 2023-04-06 | End: 2023-04-06 | Stop reason: HOSPADM

## 2023-04-06 RX ORDER — CARVEDILOL 12.5 MG/1
12.5 TABLET ORAL 2 TIMES DAILY WITH MEALS
Status: DISCONTINUED | OUTPATIENT
Start: 2023-04-06 | End: 2023-04-06

## 2023-04-06 RX ORDER — LISINOPRIL 5 MG/1
10 TABLET ORAL DAILY
Status: DISCONTINUED | OUTPATIENT
Start: 2023-04-06 | End: 2023-04-06

## 2023-04-06 RX ORDER — DEXTROSE MONOHYDRATE 100 MG/ML
0-250 INJECTION, SOLUTION INTRAVENOUS AS NEEDED
Status: DISCONTINUED | OUTPATIENT
Start: 2023-04-06 | End: 2023-04-06 | Stop reason: HOSPADM

## 2023-04-06 RX ORDER — SPIRONOLACTONE 25 MG/1
25 TABLET ORAL DAILY
Status: DISCONTINUED | OUTPATIENT
Start: 2023-04-06 | End: 2023-04-06

## 2023-04-06 RX ORDER — INSULIN GLARGINE 100 [IU]/ML
40 INJECTION, SOLUTION SUBCUTANEOUS DAILY
Status: DISCONTINUED | OUTPATIENT
Start: 2023-04-06 | End: 2023-04-06 | Stop reason: HOSPADM

## 2023-04-06 RX ORDER — ACETAMINOPHEN 650 MG/1
650 SUPPOSITORY RECTAL
Status: DISCONTINUED | OUTPATIENT
Start: 2023-04-06 | End: 2023-04-06

## 2023-04-06 RX ORDER — POLYETHYLENE GLYCOL 3350 17 G/17G
17 POWDER, FOR SOLUTION ORAL DAILY PRN
Status: DISCONTINUED | OUTPATIENT
Start: 2023-04-06 | End: 2023-04-06 | Stop reason: HOSPADM

## 2023-04-06 RX ADMIN — IOPAMIDOL 100 ML: 755 INJECTION, SOLUTION INTRAVENOUS at 00:47

## 2023-04-06 RX ADMIN — SODIUM CHLORIDE 1000 ML: 9 INJECTION, SOLUTION INTRAVENOUS at 09:35

## 2023-04-06 RX ADMIN — PHYTONADIONE 10 MG: 10 INJECTION, EMULSION INTRAMUSCULAR; INTRAVENOUS; SUBCUTANEOUS at 10:48

## 2023-04-06 RX ADMIN — INSULIN GLARGINE 40 UNITS: 100 INJECTION, SOLUTION SUBCUTANEOUS at 09:26

## 2023-04-06 RX ADMIN — INSULIN LISPRO 14 UNITS: 100 INJECTION, SOLUTION INTRAVENOUS; SUBCUTANEOUS at 09:26

## 2023-04-06 RX ADMIN — MEDROXYPROGESTERONE ACETATE 20 MG: 10 TABLET ORAL at 15:13

## 2023-04-06 RX ADMIN — SODIUM CHLORIDE 125 ML/HR: 9 INJECTION, SOLUTION INTRAVENOUS at 09:38

## 2023-04-06 RX ADMIN — INSULIN LISPRO 7 UNITS: 100 INJECTION, SOLUTION INTRAVENOUS; SUBCUTANEOUS at 12:15

## 2023-04-06 NOTE — DISCHARGE INSTRUCTIONS
Patient Discharge Instructions    Norberto Castillo / [id number] : 1975    Admitted 2023 Discharged: 2023     Primary Diagnoses  @Rprob@    Take Home Medications     It is important that you take the medication exactly as they are prescribed. Keep your medication in the bottles provided by the pharmacist and keep a list of the medication names, dosages, and times to be taken in your wallet. Do not take other medications without consulting your doctor. What to do at Home    Recommended diet: Regular Diet    Recommended activity: Activity as tolerated    If you experience worse bleeding, please follow up with OB Gyn. Follow-up with your PCP in a few weeks    [unfilled]     Information obtained by :  I understand that if any problems occur once I am at home I am to contact my physician. I understand and acknowledge receipt of the instructions indicated above.                                                                                                                                            Physician's or R.N.'s Signature                                                                  Date/Time                                                                                                                                              Patient or Representative Signature                                                          Date/Time

## 2023-04-06 NOTE — ED TRIAGE NOTES
Patient has uterine fibroid and cysts, has been having menstrual cycle X4 months and this evening began passing large clots and feeling dizzy with SOB

## 2023-04-06 NOTE — ED NOTES
Patient was transferred from outside facility. I saw and evaluated the patient on arrival and they are stable for admission.  Hospitalist and OB hospitalist were notified on arrival.

## 2023-04-06 NOTE — PROGRESS NOTES
4/6/2023  3:15 PM  Pt requesting transportation assistance. CM met w/ pt explored options, agreed to schedule pt w/ Roundtrip, she declined stating she could find a way home,   Pt stated  she is continuing to have bleeding and not feeling well, CM advised she update nursing. KENYATTA Terrazas Asp       12:07 PM  CM met w/ pt for DC planning. Charted demographics verified, pt lives w/ her 25 and 22 yo children and is independent at baseline, pt is ambulatory uses no DME and denies falls. She is employed and drives. Family can assist at home If needed  Pt is on [employer] PTA and has applied for their medication assistance program and is approved w/ a $40.00/month co-pay  Family will transport at DC   There are no CM DC needs noted   Care Management Interventions  PCP Verified by CM:  Yes Adra Terry)  Palliative Care Criteria Met (RRAT>21 & CHF Dx)?: No  Mode of Transport at Discharge: Self (Family)  Physical Therapy Consult: No  Occupational Therapy Consult: No  Speech Therapy Consult: No  Support Systems: Child(ed)  Confirm Follow Up Transport: Self  Discharge Location  Patient Expects to be Discharged to[de-identified] Home  KENYATTA Terrazas Asp

## 2023-04-06 NOTE — CONSULTS
[facility] [place] [address] [facility]  [place]: [phone]  F: [phone]      Reason for Visit:   Kay Rick is a 49 y.o. female who is seen for evaluation of hx of mult clots/ now with menorrhagia. Hematology/Oncology Treatment History:   Per patient Hx of SVTand PE: followed by PCP       History of Present Illness:   Kay Rick is a pleasant 49 y.o. female who was admitted on 23  for heavy bleeding with clots. ED labs Hgb 7.4 . Received 1 unit of PRBCs in [place] ED. Hx of HTN, DM, uterine fibroid and cysts; menstrual cycle x 4 months  Pt reports heavy menstrual cycle x months. Reports SVT LLE  treated with  Xarelto x 3 months; SVT RLE   treated  with Xarelto x 3 months. Dx with PE  in  and was placed on Xarelto long term. Has had breaks in medication  of 2-3 weeks when pt was off Xarelto due to inability to afford; last time off Xarelto was around [place] until  week of January. No family at bedside.        Past Medical History:   Diagnosis Date    DM (diabetes mellitus) ([address]     Hx of deep venous thrombosis     Hx pulmonary embolism     Hypertension     Obese     PCOS (polycystic ovarian syndrome)        Past Surgical History:   Procedure Laterality Date    HX  SECTION  2003    HX HEENT Bilateral 1980    LAZY EYE    HX HEENT  [id number]    18 SETS OF EAR TUBES    HX LUMBAR DISKECTOMY      HX TONSILLECTOMY  1978       Social History     Socioeconomic History    Marital status: SINGLE     Spouse name: Not on file    Number of children: Not on file    Years of education: Not on file    Highest education level: Not on file   Occupational History    Not on file   Tobacco Use    Smoking status: Never    Smokeless tobacco: Never   Substance and Sexual Activity    Alcohol use: No    Drug use: Never    Sexual activity: Not on file   Other Topics Concern    Not on file   Social History Narrative    Not on file     Social Determinants of Health     Financial Resource Strain: Not on file   Food Insecurity: Not on file   Transportation Needs: Not on file   Physical Activity: Not on file   Stress: Not on file   Social Connections: Not on file   Intimate Partner Violence: Not on file   Housing Stability: Not on file       Family History   Problem Relation Age of Onset    Diabetes Mother     Heart Attack Mother     Stroke Mother     Heart Surgery Mother     OSTEOARTHRITIS Mother     Kidney Disease Father     Other Brother         STILLBORN    Anesth Problems Neg Hx        Current Facility-Administered Medications   Medication Dose Route Frequency    0.9% sodium chloride infusion 250 mL  250 mL IntraVENous PRN    sodium chloride (NS) flush 5-40 mL  5-40 mL IntraVENous Q8H    sodium chloride (NS) flush 5-40 mL  5-40 mL IntraVENous PRN    acetaminophen (TYLENOL) tablet 650 mg  650 mg Oral Q6H PRN    polyethylene glycol (MIRALAX) packet 17 g  17 g Oral DAILY PRN    ondansetron (ZOFRAN ODT) tablet 4 mg  4 mg Oral Q8H PRN    Or    ondansetron (ZOFRAN) injection 4 mg  4 mg IntraVENous Q6H PRN    sodium chloride 0.9 % bolus infusion 1,000 mL  1,000 mL IntraVENous ONCE    0.9% sodium chloride infusion  125 mL/hr IntraVENous CONTINUOUS    insulin glargine (LANTUS) injection 40 Units  40 Units SubCUTAneous DAILY    insulin lispro (HUMALOG) injection   SubCUTAneous Q6H    glucose chewable tablet 16 g  4 Tablet Oral PRN    glucagon (GLUCAGEN) injection 1 mg  1 mg IntraMUSCular PRN    dextrose 10% infusion 0-250 mL  0-250 mL IntraVENous PRN    atenoloL (TENORMIN) tablet 25 mg  25 mg Oral DAILY    phytonadione (vitamin K1) (AQUA-MEPHYTON) 10 mg in 0.9% sodium chloride 50 mL IVPB  10 mg IntraVENous ONCE       Allergies   Allergen Reactions    Percocet [Oxycodone-Acetaminophen] Nausea and Vomiting          Review of Systems: A complete review of systems was obtained, reviewed.   Pertinent findings reviewed above.    Physical Exam:   Visit Vitals  BP 92/67 (BP 1 Location: Left lower arm, BP Patient Position: At rest)   Pulse (!) 101   Temp 98 °F (36.7 °C)   Resp 16   Ht 5' 7\" (1.702 m)   Wt 250 lb (113.4 kg)   SpO2 97%   BMI 39.16 kg/m²     ECOG PS: 1-2  General: obese; no distress  Eyes: PERRLA, anicteric sclerae  HENT: atraumatic, oropharynx clear  Neck: supple  Lymphatic: no cervical, supraclavicular, or inguinal adenopathy  Respiratory: anteriorly CTAB, normal respiratory effort  CV: normal rate, regular rhythm, no murmurs, no peripheral edema  GI: soft, nontender, nondistended, no masses, no hepatomegaly, no splenomegaly  MS: digits without clubbing or cyanosis. Skin: no rashes, no ecchymoses, no petechia. normal temperature, turgor, and texture. Psych: alert, oriented, appropriate affect, normal judgment/insight    Results:     Lab Results   Component Value Date/Time    WBC 6.7 04/05/2023 11:00 PM    HGB 7.9 (L) 04/06/2023 04:55 AM    HCT 27.0 (L) 04/06/2023 04:55 AM    PLATELET 567 06/79/0579 11:00 PM    MCV 68.9 (L) 04/05/2023 11:00 PM    ABS. NEUTROPHILS 4.2 04/05/2023 11:00 PM     Lab Results   Component Value Date/Time    Sodium 135 (L) 04/05/2023 11:00 PM    Potassium 4.8 04/05/2023 11:00 PM    Chloride 98 04/05/2023 11:00 PM    CO2 27 04/05/2023 11:00 PM    Glucose 567 (H) 04/05/2023 11:00 PM    BUN 17 04/05/2023 11:00 PM    Creatinine 1.18 (H) 04/05/2023 11:00 PM    GFR est AA >60 07/09/2019 11:02 AM    GFR est non-AA 55 (L) 07/09/2019 11:02 AM    Calcium 8.6 04/05/2023 11:00 PM    Glucose (POC) 414 (H) 04/06/2023 08:19 AM     Lab Results   Component Value Date/Time    Bilirubin, total 0.2 04/05/2023 11:00 PM    ALT (SGPT) 14 04/05/2023 11:00 PM    Alk.  phosphatase 107 (H) 04/05/2023 11:00 PM    Protein, total 5.6 (L) 04/05/2023 11:00 PM    Albumin 3.5 04/05/2023 11:00 PM    Globulin 2.1 04/05/2023 11:00 PM     Lab Results   Component Value Date/Time    Reticulocyte count 2.3 (H) 04/06/2023 07:31 AM  04/06/2023 07:31 AM    TSH 3.35 04/06/2023 07:31 AM       Lab Results   Component Value Date/Time    INR 2.0 (H) 04/05/2023 11:00 PM      \  Lab Results   Component Value Date/Time     04/06/2023 07:31 AM    Beta HCG, QT <1 04/05/2023 11:00 PM       4/6/23 US transvaginal   IMPRESSION   Fibroid uterus  Borderline thickened endometrium  Nonvisualization of either ovary. Nonvisualization of right adnexal structure    4/6/23 CTA Chest .CT  A/P   IMPRESSION     Positive for right lower lobe pulmonary embolism without right heart strain or pulmonary infarct     Right adnexal 4.7 cm structure could represent an exophytic subserosal fibroid  or ovarian mass. Transvaginal ultrasound was not able to differentiate. Cystitis   Esophagitis   Multiple ill-defined pulmonary nodules. Consider metastasis or an infectious or  inflammatory process. Test results above have been reviewed. Assessment/Recommendations:     Recurrent PE  Reviewed CT  imaging with radiology; noted PE clot on imaging appears chronic; not acute. Therefor would not consider this a failure of medication but may have developed during break in Xarelto. --recommendation to continue Xarelto ;   --complete work up with doppler BLE  --follow up  with PCP/ Dr Toney Monzon    2. Pulmonary nodules  Unclear etiology  --pulmonary consulted    3. Menorrhagia/ Fibroids/ ovarian mass  -GYN consulted : recommend continuation of anticoagulation and addition of Provera BID to slow/stop bleeding. Pan for endometrial bx outpatient once bleeding controlled. 4. Anemia s/p 1 unit PRBCs  Anemia labs in process  -cont to monitor and transfuse for Hgb < 7       Plan reviewed with Dr Teresa Esquivel.      Signed By: Cliff Moody NP

## 2023-04-06 NOTE — ED PROVIDER NOTES
Lorene Bishop is a 49 y.o. female with a history of hypertension, diabetes mellitus, on rivaroxaban for pulmonary embolism () with history of multiple deep vein thromboses, months of abnormal uterine bleeding, US showing fibroids 2 days ago ordered by her family practitioner, Dr. Maggie Rocha who presents to the ED via EMS complaining of 2 days of heavy vaginal bleeding with clots. Heavier bleeding with progressively worsening dizziness, dyspnea, lightheadedness, fatigue, exertional intolerance for the past week. Has had crampy abdominal/pelvic pain.     Past Medical History:   Diagnosis Date    Diabetes ([facility]     Hypertension     Ill-defined condition     PCOS    PCOS (polycystic ovarian syndrome)     [facility]ol[facility] ([facility]     2017 superficial varicose veins       Past Surgical History:   Procedure Laterality Date    HX  SECTION  2003    HX HEENT Bilateral 1980     LAZY EYE    HX HEENT  [id number]    18 SETS OF EAR TUBES    HX LUMBAR DISKECTOMY      HX TONSILLECTOMY  1978         Family History:   Problem Relation Age of Onset    Diabetes Mother     Heart Attack Mother     Stroke Mother     Heart Surgery Mother     OSTEOARTHRITIS Mother     Kidney Disease Father     Other Brother         STILLBORN    Anesth Problems Neg Hx        Social History     Socioeconomic History    Marital status: SINGLE     Spouse name: Not on file    Number of children: Not on file    Years of education: Not on file    Highest education level: Not on file   Occupational History    Not on file   Tobacco Use    Smoking status: Never    Smokeless tobacco: Never   Substance and Sexual Activity    Alcohol use: No    Drug use: Never    Sexual activity: Not on file   Other Topics Concern    Not on file   Social History Narrative    Not on file     Social Determinants of Health     Financial Resource Strain: Not on file   Food Insecurity: Not on file   Transportation Needs: Not on file   Physical Activity: Not on file   Stress: Not on file   Social Connections: Not on file   Intimate Partner Violence: Not on file   Housing Stability: Not on file         ALLERGIES: Percocet [oxycodone-acetaminophen]    Review of Systems  See HPI for relevant review of systems. Vitals:    04/06/23 0200 04/06/23 0205 04/06/23 0210 04/06/23 0215   BP: 139/64 (!) 121/93 130/64    Pulse: (!) 101 (!) 104 (!) 102    Resp: 12 13 13    Temp:    98 °F (36.7 °C)   SpO2:  100% 100%    Weight:       Height:                Physical Exam  Vitals and nursing note reviewed. Constitutional:       General: She is in acute distress. HENT:      Head: Normocephalic and atraumatic. Cardiovascular:      Rate and Rhythm: Regular rhythm. Tachycardia present. Pulmonary:      Effort: No respiratory distress. Skin:     Coloration: Skin is pale. Neurological:      General: No focal deficit present. Mental Status: She is alert. Medical Decision Making  Amount and/or Complexity of Data Reviewed  Labs: ordered. Decision-making details documented in ED Course. Radiology: ordered. Decision-making details documented in ED Course. ECG/medicine tests: ordered. Risk  Prescription drug management. Vital Signs: I independently reviewed the patients vital signs, which were notable for tachycardia, borderline hypotension, otherwise within normal limits and stable. Cardiac Monitor/Pulse Oximetry Interpretation:  Cardiac Monitor  Rate: 114  Rhythm: sinus tachycardia  Interpretation: abnormal per Elpidio Padilla MD  Pulse Oximetry  O2 source: room air  O2 Sat: 97%  Interpretation: Normal per Elpidio Padilla MD    Nursing Notes: I independently reviewed and utilized the nursing notes. Old Medical Records: I independently reviewed the patients available external past medical records and past encounters.     Differential Diagnoses: My differential diagnosis considered included, but was not limited to:   Dysfunctional uterine bleeding  intrauterine or ectopic pregnancy   Acute blood loss anemia  Coagulopathy  pulmonary embolism  pneumothorax  acute coronary syndrome (ACS)  congestive heart failure  acute kidney injury (HOWIE)  electrolyte derangement      ED Course/Updates:  I independently ordered, and the patient was given:  Medications   0.9% sodium chloride infusion 250 mL (has no administration in time range)   iopamidoL (ISOVUE-370) 370 mg iodine /mL (76 %) injection 100 mL (100 mL IntraVENous Given 4/6/23 0047)     ED Course as of 04/06/23 0232   Wed Apr 05, 2023   1977 Perfect Serve messaged on call gynecology at [place] [address]. They advised they only see established patients of their practice and referred me to the [facility] hospitalist [MY]   Thu Apr 06, 2023   0003 I discussed the history, exam, findings, and plan with with Dr. Oren Kay (OB hosptialist), who will consult. Advises ED->ED transfer. [MY]   0018 I discussed the history, exam, findings, and plan with with Dr. Harry Dickens ([address] [place] [address] emergency physician), who accepts patient in transfer to their emergency department. [MY]   [id number] Dr. Oren Kay requested that I consult hospitalist for anticoagulation recommendations [MY]   Anda Meigs Dr. Calvert Dieter, hospitalist [MY]   4404 HCG QT:    Beta HCG, QT <1  Unlikely intrauterine or ectopic pregnancy  [MY]   0231 CTA CHEST W OR W WO CONT  Positive for right lower lobe pulmonary embolism without right heart strain or  pulmonary infarct     Right adnexal 4.7 cm structure could represent an exophytic subserosal fibroid  or ovarian mass. Transvaginal ultrasound was not able to differentiate. [MY]   6283 OR VCMORQONNAZG  Fibroid uterus  Borderline thickened endometrium  Nonvisualization of either ovary. Nonvisualization of right adnexal structure [MY]   0232 HR improved to 102. /67. Receiving blood.  [MY]      ED Course User Index  [MY] Sandip Bruner MD     Diagnostic laboratory and/or imaging tests were ordered, reviewed and independently interpreted by me, as above. Clinical Impression(s):  1. Symptomatic anemia    2. Acute blood loss anemia    3. DUB (dysfunctional uterine bleeding)    4. Chronic anticoagulation    5. Hyperglycemia due to diabetes mellitus ([facility]    6. Pulmonary embolism without acute cor pulmonale, unspecified chronicity, unspecified pulmonary embolism type ([facility]        Disposition:  Transferred to Another Facility  -------------------------------------------------------------------   [employer] may have been used to aid in this documentation; dictation errors may exist as a result. EKG    Date/Time: 4/5/2023 11:00 PM  Performed by: Teresa Ahmadi MD  Authorized by: Teresa Ahmadi MD     ECG reviewed by ED Physician in the absence of a cardiologist: yes    Interpretation:     Interpretation: abnormal    Rate:     ECG rate:  116    ECG rate assessment: tachycardic    Rhythm:     Rhythm: sinus tachycardia    QRS:     QRS axis:  Left  Other findings:     Other findings: LVH    Critical Care  Performed by: Teresa Ahmadi MD  Authorized by:  Teresa Ahmadi MD     Critical care provider statement:     Critical care time (minutes):  80    Critical care time was exclusive of:  Separately billable procedures and treating other patients and teaching time    Critical care was necessary to treat or prevent imminent or life-threatening deterioration of the following conditions:  Respiratory failure, circulatory failure, shock, cardiac failure and metabolic crisis    Critical care was time spent personally by me on the following activities:  Development of treatment plan with patient or surrogate, evaluation of patient's response to treatment, examination of patient, interpretation of cardiac output measurements, ordering and performing treatments and interventions, ordering and review of laboratory studies, ordering and review of radiographic studies, pulse oximetry, review of old charts, re-evaluation of patient's condition and discussions with consultants    I assumed direction of critical care for this patient from another provider in my specialty: no      Care discussed with: accepting provider at another facility

## 2023-04-06 NOTE — ED NOTES
Patient uncrossed blood transfusion started. Blood double verified with Nolvia Mariano RN. Patient VSS. Initial rate for blood 75 ml/hr. This nurse will remain bedside to assess for any s/sx of transfusion reaction. Patient connected to central monitoring at this time.

## 2023-04-06 NOTE — DISCHARGE SUMMARY
Physician Discharge Summary     Patient ID:  Janett Barnett  [id number]  49 y.o.  1975    Admit date: 4/5/2023    Discharge date of service and time: 4/6/2023  Greater than 30 minutes were spent providing discharge related services for this patient    Admission Diagnoses: Menorrhagia [N92.0]    Discharge Diagnoses:    Principal Diagnosis     Menorrhagia                                             Hospital Course and other diagnoses  Menorrhagia - POA, with hx of fibroid. Gyn consulted, and they started provera, they did not want to do hysterectomy. Outpatient follow up. They recommended resuming xarelto     Anemia - POA, mostly due to acute bleed, but may have chronic disease. Checking serologies. ER ordered transfusion. DC on PO iron and MVI     Pulmonary emboli / Hx pulmonary embolism / Hx of deep venous thrombosis - Per patient she has a hx of multiple prior clots. Usually on Xarelto. Held that today but plan to resume at home, pending agreement with hematology. HOWIE (acute kidney injury) / Hyponatremia / Dehydration - POA likely due to use of exces diuretics and poor PO intake. Hydrate and stop lasix and spironolactone     DM (diabetes mellitus) type 2 without complications - Diabetic diet when eating  SSI per protocol. Continue Lantus in place of home mixed insulin. A1c useless in setting of bleed. Hypertension / Sinus tachycardia BP low due to anemia and IVVD. Hydrate. Continue atenolol. Hold lasix, spironolactone, at home resume lisinopril     Pulmonary nodules - Noted incidentally on CT. Pulmonary consulted to plan any workup, if needed. Obese  - Advise weight loss and outpatient ALEJA testing. PCOS (polycystic ovarian syndrome) - Outpatient follow up.   Not on metformin, but was getting spironolactone    PCP: Tio Galloway DO    Consults: Hematology/Oncology and Gynecology    Significant Diagnostic Studies: See Hospital Course    Discharged home in improved condition. Discharge Exam:  /62 (BP 1 Location: Left upper arm, BP Patient Position: At rest)   Pulse (!) 114   Temp 99.2 °F (37.3 °C)   Resp 16   Ht 5' 7\" (1.702 m)   Wt 113.4 kg (250 lb)   SpO2 99%   BMI 39.16 kg/m²      Gen:  Obese, in no acute distress  HEENT:  Pink conjunctivae, PERRL, hearing intact to voice, moist mucous membranes  Neck:  Supple, without masses, thyroid non-tender  Resp:  No accessory muscle use, clear breath sounds without wheezes rales or rhonchi  Card:  No murmurs, tachycardic S1, S2 without thrills, bruits or peripheral edema  Abd:  Soft, non-tender, non-distended, normoactive bowel sounds are present, no mass  Lymph:  No cervical or inguinal adenopathy  Musc:  No cyanosis or clubbing  Skin:  No rashes or ulcers, skin turgor is good  Neuro:  Cranial nerves are grossly intact, mild motor weakness, follows commands appropriately  Psych: Moderate insight, oriented to person, place and time, alert    Patient Instructions:   Current Discharge Medication List        START taking these medications    Details   medroxyPROGESTERone (PROVERA) 10 mg tablet Take 2 Tablets by mouth two (2) times a day for 30 days. Qty: 120 Tablet, Refills: 0           CONTINUE these medications which have NOT CHANGED    Details   rivaroxaban (Xarelto) 10 mg tablet Take  by mouth daily. Unknown dose      lisinopriL (PRINIVIL, ZESTRIL) 10 mg tablet Take  by mouth daily. Unknown dose      carvediloL (COREG) 12.5 mg tablet Take  by mouth two (2) times daily (with meals). Unknown dose      insulin NPH/insulin regular (NOVOLIN 70/30 U-100 INSULIN) 100 unit/mL (70-30) injection 35 Units by SubCUTAneous route two (2) times a day. STOP taking these medications       spironolactone (ALDACTONE) 25 mg tablet Comments:   Reason for Stopping:             Activity: Activity as tolerated  Diet: Diabetic Diet  Wound Care: None needed    Follow-up with your PCP and OB in a few weeks.   Follow-up tests/labs - none    Signed:  Lora Soto MD  4/6/2023  1:49 PM

## 2023-04-06 NOTE — PROGRESS NOTES
0815: TRANSFER - IN REPORT:    Verbal report received from 4th RN(name) on Pino Fung  being received from [facility] (unit) for routine progression of care      Report consisted of patients Situation, Background, Assessment and   Recommendations(SBAR). Information from the following report(s) SBAR, Kardex, Procedure Summary, and Intake/Output was reviewed with the receiving nurse. Opportunity for questions and clarification was provided. Assessment completed upon patients arrival to unit and care assumed. This patient was assisted with Intentional Toileting every 2 hours during this shift as appropriate. Documentation of ambulation and output reflected on Flowsheet as appropriate. Purposeful hourly rounding was completed using AIDET and 5Ps. Outcomes of PHR documented as they occurred. Bed alarm in use as appropriate. Dual Suction and ambubag in place. 1518: Patient not comfortable with discharge at this time. Dr. Berto Hinson notified and asked to come speak with patient .

## 2023-04-06 NOTE — PROGRESS NOTES
[facility]  [address] [facility], [facility] [address]  [phone]    Hospitalist Progress Note      NAME: Delores East   :  1975  MRM:  [id number]    Date/Time of service 2023  1:48 PM         Assessment and Plan:     Menorrhagia - POA, with hx of fibroid. Gyn consulted to consider hysterectomy. NPO in case that is done today    Anemia - POA, mostly due to acute bleed, but may have chronic disease. Check serologies. ER ordered transfusion. Pulmonary emboli / Hx pulmonary embolism / Hx of deep venous thrombosis - Per patient she has a hx of multiple prior clots. Usually on Xarelto. Holding that. Consult hematology to confirm best plan for anticoagulation in setting of bleeding. HOWIE (acute kidney injury) / Hyponatremia / Dehydration - POA likely due to use of exces diuretics and poor PO intake. Hydrate and stop lasix and spironolactone    DM (diabetes mellitus) type 2 without complications - Diabetic diet when eating  SSI per protocol. Continue Lantus in place of home mixed insulin. A1c useless in setting of bleed. Hypertension / Sinus tachycardia BP low due to anemia and IVVD. Hydrate. Continue atenolol. Hold lasix, spironolactone and lisinopril    Pulmonary nodules - Noted incidentally on CT. Pulmonary consulted to plan any workup, if needed. Obese  - Advise weight loss and outpatient ALEJA testing. PCOS (polycystic ovarian syndrome) - Outpatient follow up. Not on metformin       Subjective:     Chief Complaint:  bleeding    ROS:  (bold if positive, if negative)    Tolerating PT   NPO       Objective:     Last 24hrs VS reviewed since prior progress note.  Most recent are:    Visit Vitals  /62 (BP 1 Location: Left upper arm, BP Patient Position: At rest)   Pulse (!) 114   Temp 99.2 °F (37.3 °C)   Resp 16   Ht 5' 7\" (1.702 m)   Wt 113.4 kg (250 lb)   SpO2 99%   BMI 39.16 kg/m²     SpO2 Readings from Last 6 Encounters:   23 99% 03/19/23 99%   07/17/19 99%   10/28/13 97%   12/23/12 100%          Intake/Output Summary (Last 24 hours) at 4/6/2023 7874  Last data filed at 4/6/2023 0149  Gross per 24 hour   Intake 279 ml   Output --   Net 279 ml        Physical Exam:    Gen:  Obese, in no acute distress  HEENT:  Pink conjunctivae, PERRL, hearing intact to voice, moist mucous membranes  Neck:  Supple, without masses, thyroid non-tender  Resp:  No accessory muscle use, clear breath sounds without wheezes rales or rhonchi  Card:  No murmurs, tachycardic S1, S2 without thrills, bruits or peripheral edema  Abd:  Soft, non-tender, non-distended, normoactive bowel sounds are present, no mass  Lymph:  No cervical or inguinal adenopathy  Musc:  No cyanosis or clubbing  Skin:  No rashes or ulcers, skin turgor is good  Neuro:  Cranial nerves are grossly intact, mild motor weakness, follows commands appropriately  Psych:   Moderate insight, oriented to person, place and time, alert    Telemetry reviewed:   normal sinus rhythm  __________________________________________________________________  Medications Reviewed: (see below)  Medications:     Current Facility-Administered Medications   Medication Dose Route Frequency    0.9% sodium chloride infusion 250 mL  250 mL IntraVENous PRN    sodium chloride (NS) flush 5-40 mL  5-40 mL IntraVENous Q8H    sodium chloride (NS) flush 5-40 mL  5-40 mL IntraVENous PRN    acetaminophen (TYLENOL) tablet 650 mg  650 mg Oral Q6H PRN    polyethylene glycol (MIRALAX) packet 17 g  17 g Oral DAILY PRN    ondansetron (ZOFRAN ODT) tablet 4 mg  4 mg Oral Q8H PRN    Or    ondansetron (ZOFRAN) injection 4 mg  4 mg IntraVENous Q6H PRN    sodium chloride 0.9 % bolus infusion 1,000 mL  1,000 mL IntraVENous ONCE    0.9% sodium chloride infusion  125 mL/hr IntraVENous CONTINUOUS    insulin glargine (LANTUS) injection 40 Units  40 Units SubCUTAneous DAILY    insulin lispro (HUMALOG) injection   SubCUTAneous Q6H    glucose chewable tablet 16 g  4 Tablet Oral PRN    glucagon (GLUCAGEN) injection 1 mg  1 mg IntraMUSCular PRN    dextrose 10% infusion 0-250 mL  0-250 mL IntraVENous PRN    atenoloL (TENORMIN) tablet 25 mg  25 mg Oral DAILY     Current Outpatient Medications   Medication Sig    rivaroxaban (Xarelto) 10 mg tablet Take  by mouth daily. Unknown dose    spironolactone (ALDACTONE) 25 mg tablet Take  by mouth daily. Unknown dose    lisinopriL (PRINIVIL, ZESTRIL) 10 mg tablet Take  by mouth daily. Unknown dose    carvediloL (COREG) 12.5 mg tablet Take  by mouth two (2) times daily (with meals). Unknown dose    insulin NPH/insulin regular (NOVOLIN 70/30 U-100 INSULIN) 100 unit/mL (70-30) injection 35 Units by SubCUTAneous route two (2) times a day. Lab Data Reviewed: (see below)  Lab Review:     Recent Labs     04/06/23  0455 04/05/23  2300   WBC  --  6.7   HGB 7.9* 7.4*   HCT 27.0* 25.9*   PLT  --  278     Recent Labs     04/05/23  2300   *   K 4.8   CL 98   CO2 27   *   BUN 17   CREA 1.18*   CA 8.6   ALB 3.5   TBILI 0.2   ALT 14   INR 2.0*     Lab Results   Component Value Date/Time    Glucose (POC) 155 (H) 07/17/2019 12:37 PM    Glucose (POC) 121 (H) 07/17/2019 10:34 AM     No results for input(s): PH, PCO2, PO2, HCO3, FIO2 in the last 72 hours. Recent Labs     04/05/23  2300   INR 2.0*     All Micro Results       None            Other pertinent lab: none    Total time spent with patient: 45 Minutes I personally rounded from 1:02 to 1:47 PM in addition to the time spent by my partner, Dr Vito Donnelly, earlier today. I personally reviewed chart, notes, data and current medications in the medical record. I have personally examined and treated the patient at bedside during this period. I personally made additional diagnoses, placed additional orders and had additional discussions.                   Care Plan discussed with: Patient, Care Manager, Nursing Staff, Consultant/Specialist, and >50% of time spent in counseling and coordination of care    Discussed:  Care Plan and D/C Planning    Prophylaxis:  SCD's and H2B/PPI    Disposition:  Home w/Family           ___________________________________________________    Attending Physician: Storm Rendon MD

## 2023-04-06 NOTE — ED NOTES
Patient blood transfusion rate increased to 150ml/hr. Patient showing no s/sx of transfusion reaction. Patient alert and oriented x4. Patient call light within reach and patient is resting comfortably in bed.

## 2023-04-06 NOTE — CONSULTS
Consult OB/GYN      Assessment:   48 y/o female presenting with symptomatic anemia and menometrorrhagia with history of PE(~2020) on Xarelto, chronic hypertension and poorly controlled diabetes mellitis. US pelvis describes thickened endometrial lining and 4-5 cm Myoma with complex ovarian cyst right adnexa. CT chest shows pulmonary nodules and string like vascular defect RLL (?residual)              Plan:   I recommend not stopping anticoagulation at this time. Consider Provera 20mg PO BID to slow/stop uterine bleeding. Minimal thrombotic risk while on therapeutic Xarelto. She will need endometrial biopsy once bleeding controlled which can be done as outpatient. Gain glucose control                 Subjective:     Patient is a 49 y.o. [id number] presenting from [facility] ED for severe mental menorrhagia and symptomatic anemia. Patient states she has a history of PCOS related irregular bleeding however in the last 4 months she has began bleeding daily with variable amounts of menstrual discharge. However in the last 2 weeks the bleeding has increased and became severe bleeding in the last 48 hours passing large clots the size of her fist and pushing out extra strength tampons. In the last 48 hours she has become lightheaded and dizzy with ambulation. Significantly she has been on Xarelto since a pulmonary embolism in 2020. In addition she has poorly controlled diabetes, chronic hypertension and obesity. Prior to transfer from [facility] ED she received 1 unit of unmatched blood. She denies change in appetite, early satiety or recent weight loss.     Patient Active Problem List    Diagnosis Date Noted    Menorrhagia 04/06/2023    Anemia 04/06/2023    Pulmonary emboli ([address] 04/06/2023    Obese 04/06/2023    HOWIE (acute kidney injury) ([address] 04/06/2023    Hyponatremia 04/06/2023    Dehydration 04/06/2023    DM (diabetes mellitus) ([address] 04/06/2023    Hx pulmonary embolism 04/06/2023    Hypertension 04/06/2023    PCOS (polycystic ovarian syndrome) 2023    Hx of deep venous thrombosis 2023    Sinus tachycardia 2023    Pulmonary nodules 2023    Uncontrolled type 2 diabetes mellitus with hyperglycemia ([facility] 07/10/2019     Past Medical History:   Diagnosis Date    DM (diabetes mellitus) ([facility] [address]     Hx of deep venous thrombosis     Hx pulmonary embolism     Hypertension     Obese     PCOS (polycystic ovarian syndrome)       Past Surgical History:   Procedure Laterality Date    HX  SECTION  2003    HX HEENT Bilateral 1980    LAZY EYE    HX HEENT  [id number]    18 SETS OF EAR TUBES    HX LUMBAR DISKECTOMY      HX TONSILLECTOMY        OB History   No obstetric history on file. Family History   Problem Relation Age of Onset    Diabetes Mother     Heart Attack Mother     Stroke Mother     Heart Surgery Mother     OSTEOARTHRITIS Mother     Kidney Disease Father     Other Brother         STILLBORN    Anesth Problems Neg Hx      Social History     Tobacco Use    Smoking status: Never    Smokeless tobacco: Never   Substance Use Topics    Alcohol use: No     No current facility-administered medications on file prior to encounter. Current Outpatient Medications on File Prior to Encounter   Medication Sig Dispense Refill    rivaroxaban (Xarelto) 10 mg tablet Take  by mouth daily. Unknown dose      spironolactone (ALDACTONE) 25 mg tablet Take  by mouth daily. Unknown dose      lisinopriL (PRINIVIL, ZESTRIL) 10 mg tablet Take  by mouth daily. Unknown dose      carvediloL (COREG) 12.5 mg tablet Take  by mouth two (2) times daily (with meals). Unknown dose      insulin NPH/insulin regular (NOVOLIN 70/30 U-100 INSULIN) 100 unit/mL (70-30) injection 35 Units by SubCUTAneous route two (2) times a day. Allergies   Allergen Reactions    Percocet [Oxycodone-Acetaminophen] Nausea and Vomiting            Review of Systems   Constitutional:  Negative for chills and fever.    HENT:  Negative for nosebleeds and sinus pain. Respiratory:  Positive for cough and shortness of breath. Negative for hemoptysis and sputum production. Cardiovascular:  Negative for chest pain and leg swelling. Gastrointestinal:  Negative for abdominal pain, blood in stool, diarrhea, nausea and vomiting. Genitourinary:  Negative for dysuria, frequency and urgency. Musculoskeletal:  Negative for myalgias. Neurological:  Positive for dizziness and weakness. Negative for loss of consciousness. Psychiatric/Behavioral: Negative. Objective:       General: Well developed, Well nourished, and Anxious  HEENT: No thyromegaly, Pupils equal, Round, and Reactive to light  Lungs: Clear to auscultation bilaterally, Normal breath sounds, and Normal respiratory rate  Cardiac: tachycardic, Regular rhythm, and No Murmur  Abdomen: Protuberant, non tender, no mass or HSM. Normal BS. No inguinal adenopathy  Pelvic: normal external female genitalia, without lesions, Rugated and moist vagina , Normal appearing cervix, without lesions , and With discharge: clotted blood . Uterus 9 weeks mobile, NT. No induration or nodularity of pelvic floor. Ovaries not palpable.    Extremities: No visible lesions, No edema, and No calf tenderness  Neurologic: alert and oriented x 4, CN grossly intact, and Non focal neurologic exam        LABS:    Recent Results (from the past 12 hour(s))   EKG, 12 LEAD, INITIAL    Collection Time: 04/05/23 10:55 PM   Result Value Ref Range    Ventricular Rate 116 BPM    Atrial Rate 116 BPM    P-R Interval 166 ms    QRS Duration 96 ms    Q-T Interval 324 ms    QTC Calculation (Bezet) 450 ms    Calculated P Axis 52 degrees    Calculated R Axis -39 degrees    Calculated T Axis 55 degrees    Diagnosis       Sinus tachycardia  Left axis deviation  Moderate voltage criteria for LVH, may be normal variant ( R in aVL , Sammy   product )  Septal infarct (cited on or before 09-JUL-2019)  Abnormal ECG  When compared with ECG of 09-JUL-2019 10:32,  T wave inversion no longer evident in Inferior leads     CBC WITH AUTOMATED DIFF    Collection Time: 04/05/23 11:00 PM   Result Value Ref Range    WBC 6.7 3.6 - 11.0 K/uL    RBC 3.76 (L) 3.80 - 5.20 M/uL    HGB 7.4 (L) 11.5 - 16.0 g/dL    HCT 25.9 (L) 35.0 - 47.0 %    MCV 68.9 (L) 80.0 - 99.0 FL    MCH 19.7 (L) 26.0 - 34.0 PG    MCHC 28.6 (L) 30.0 - 36.5 g/dL    RDW 16.0 (H) 11.5 - 14.5 %    PLATELET 773 419 - 592 K/uL    MPV 11.1 8.9 - 12.9 FL    NRBC 0.0 0  WBC    ABSOLUTE NRBC 0.00 0.00 - 0.01 K/uL    NEUTROPHILS 63 32 - 75 %    LYMPHOCYTES 24 12 - 49 %    MONOCYTES 7 5 - 13 %    EOSINOPHILS 3 0 - 7 %    BASOPHILS 1 0 - 1 %    IMMATURE GRANULOCYTES 2 (H) 0.0 - 0.5 %    ABS. NEUTROPHILS 4.2 1.8 - 8.0 K/UL    ABS. LYMPHOCYTES 1.6 0.8 - 3.5 K/UL    ABS. MONOCYTES 0.5 0.0 - 1.0 K/UL    ABS. EOSINOPHILS 0.2 0.0 - 0.4 K/UL    ABS. BASOPHILS 0.1 0.0 - 0.1 K/UL    ABS. IMM. GRANS. 0.1 (H) 0.00 - 0.04 K/UL    DF SMEAR SCANNED      RBC COMMENTS MICROCYTOSIS  2+        RBC COMMENTS HYPOCHROMIA  1+        RBC COMMENTS OVALOCYTES  PRESENT       METABOLIC PANEL, COMPREHENSIVE    Collection Time: 04/05/23 11:00 PM   Result Value Ref Range    Sodium 135 (L) 136 - 145 mmol/L    Potassium 4.8 3.5 - 5.1 mmol/L    Chloride 98 98 - 107 mmol/L    CO2 27 22 - 29 mmol/L    Anion gap 10 5 - 15 mmol/L    Glucose 567 (H) 65 - 100 mg/dL    BUN 17 6 - 20 MG/DL    Creatinine 1.18 (H) 0.50 - 0.90 MG/DL    BUN/Creatinine ratio 14 12 - 20      eGFR 57 (L) >60 ml/min/1.73m2    Calcium 8.6 8.6 - 10.0 MG/DL    Bilirubin, total 0.2 0.2 - 1.0 MG/DL    ALT (SGPT) 14 10 - 35 U/L    AST (SGOT) 9 (L) 10 - 35 U/L    Alk.  phosphatase 107 (H) 35 - 104 U/L    Protein, total 5.6 (L) 6.4 - 8.3 g/dL    Albumin 3.5 3.5 - 5.2 g/dL    Globulin 2.1 2.0 - 4.0 g/dL    A-G Ratio 1.7 1.1 - 2.2     TYPE & SCREEN    Collection Time: 04/05/23 11:00 PM   Result Value Ref Range    Crossmatch Expiration 04/08/2023,2359     ABO/Rh(D) Maggi Dean NEGATIVE Antibody screen NEG     Unit number [id number]     Blood component type RC LR,2     Unit division 00     Status of unit ISSUED     Crossmatch result Compatible    PROTHROMBIN TIME + INR    Collection Time: 04/05/23 11:00 PM   Result Value Ref Range    INR 2.0 (H) 0.9 - 1.1      Prothrombin time 22.3 (H) 11.9 - 14.6 sec   BETA HCG, QT    Collection Time: 04/05/23 11:00 PM   Result Value Ref Range    Beta HCG, QT <1 <7 MIU/ML   RBC, ALLOCATE    Collection Time: 04/06/23 12:30 AM   Result Value Ref Range    HISTORY CHECKED? Historical check performed    URINALYSIS W/ RFLX MICROSCOPIC    Collection Time: 04/06/23 12:45 AM   Result Value Ref Range    Color RED      Appearance TURBID (A) CLEAR      Specific gravity 1.010 1.003 - 1.030      pH (UA) 6.5 5.0 - 8.0      Protein 100 (A) NEG mg/dL    Glucose >1,000 (A) NEG mg/dL    Ketone Negative NEG mg/dL    Bilirubin Negative NEG      Blood LARGE (A) NEG      Urobilinogen 0.2 0.2 - 1.0 EU/dL    Nitrites Positive (A) NEG      Leukocyte Esterase Negative NEG     URINE MICROSCOPIC ONLY    Collection Time: 04/06/23 12:45 AM   Result Value Ref Range    WBC 0-4 0 - 4 /hpf    RBC >100 /hpf    Epithelial cells FEW FEW /lpf    Bacteria Negative NEG /hpf    Hyaline cast 0-2 (A) NEG /lpf   HGB & HCT    Collection Time: 04/06/23  4:55 AM   Result Value Ref Range    HGB 7.9 (L) 11.5 - 16.0 g/dL    HCT 27.0 (L) 35.0 - 47.0 %      Radiology:     US TRANSVAGINAL  Narrative: PELVIC COMPLETE/TRANSVAGINAL ULTRASOUND, 4/6/2023 12:35 AM    INDICATION: Heavy vaginal bleeding  COMPARISON: None. History of PCO S    TECHNIQUE: Multiplanar grayscale transabdominal ultrasound of the female pelvis  was performed. Multiplanar high resolution grayscale transvaginal  ultrasound of  the female pelvis was performed. The examination is supplemented with color  Doppler as needed.     FINDINGS:      Uterus: Measures 13.4 x 6.8 x 7.4 cm with a dominant 5.6 x 5.2 x 4.5 cm anterior  isoechoic submucosal fibroid. . The endometrial echo complex measures 16 mm  Left ovary/adnexa:  Not visualized because of location  Right ovary/adnexa:  Not visualized because of location  Bladder:  Normal  Peritoneum: No fluid in the pelvis. .    Additional comments: None      Impression: Fibroid uterus  Borderline thickened endometrium  Nonvisualization of either ovary. Nonvisualization of right adnexal structure  CTA CHEST W OR W WO CONT  Narrative: EXAM: CTA CHEST W OR W WO CONT, CT ABD PELV W CONT    INDICATION: dyspnea, tachycardia, h/o PE, uterine tumor, vaginal bleeding. COMPARISON: None. CONTRAST: 100 mL of Isovue-370. TECHNIQUE:   Precontrast  images were obtained to localize the volume for acquisition. Multislice helical CT arteriography was performed from the diaphragm to the  thoracic inlet during uneventful rapid bolus intravenous contrast  administration. Thin contiguous slices were obtained through abdomen and pelvis. Lung and soft tissue windows were generated. Coronal and sagittal images were  generated and 3D post processing consisting of coronal maximum intensity images  was performed. CT dose reduction was achieved through use of a standardized  protocol tailored for this examination and automatic exposure control for dose  modulation. FINDINGS:    There is a 8 mm calcification in the right mid thyroid (image 124). Diffuse mild thickening of the distal esophagus (axial image 43). Multiple ill-defined pulmonary nodules, measuring up to 9 mm    String-like defect in the right main pulmonary artery (axial image 76 and  coronal image 83). There is no mediastinal or hilar adenopathy or mass. The aorta enhances normally  without evidence of aneurysm or dissection. There are no focal abnormalities within the liver, spleen, pancreas, adrenal  glands or kidneys. 2.0 x 1.3 cm calcified splenic artery aneurysm (axial image  13). The aorta tapers without aneurysm.  There is no retroperitoneal adenopathy or  mass. The bowel is normal.  There is no ascites or free intraperitoneal air. The anteverted uterus. Normal left ovary. In the right adnexa, there is a  complex right adnexal 3.8 x 4.7 x 4.0 structure that is partially hypodense and  partly soft tissue density. Diffuse bladder wall thickening. Heterogeneous uterus. Marked thickening of the  vaginal cervical complex (axial image 53). Impression: Positive for right lower lobe pulmonary embolism without right heart strain or  pulmonary infarct    Right adnexal 4.7 cm structure could represent an exophytic subserosal fibroid  or ovarian mass. Transvaginal ultrasound was not able to differentiate. Cystitis    Esophagitis    Multiple ill-defined pulmonary nodules. Consider metastasis or an infectious or  inflammatory process. This result was called to Dr. Latasha Tavares at 0225 hours  789  CT ABD PELV W CONT  Narrative: EXAM: CTA CHEST W OR W WO CONT, CT ABD PELV W CONT    INDICATION: dyspnea, tachycardia, h/o PE, uterine tumor, vaginal bleeding. COMPARISON: None. CONTRAST: 100 mL of Isovue-370. TECHNIQUE:   Precontrast  images were obtained to localize the volume for acquisition. Multislice helical CT arteriography was performed from the diaphragm to the  thoracic inlet during uneventful rapid bolus intravenous contrast  administration. Thin contiguous slices were obtained through abdomen and pelvis. Lung and soft tissue windows were generated. Coronal and sagittal images were  generated and 3D post processing consisting of coronal maximum intensity images  was performed. CT dose reduction was achieved through use of a standardized  protocol tailored for this examination and automatic exposure control for dose  modulation. FINDINGS:    There is a 8 mm calcification in the right mid thyroid (image 124). Diffuse mild thickening of the distal esophagus (axial image 43).   Multiple ill-defined pulmonary nodules, measuring up to 9 mm    String-like defect in the right main pulmonary artery (axial image 76 and  coronal image 83). There is no mediastinal or hilar adenopathy or mass. The aorta enhances normally  without evidence of aneurysm or dissection. There are no focal abnormalities within the liver, spleen, pancreas, adrenal  glands or kidneys. 2.0 x 1.3 cm calcified splenic artery aneurysm (axial image  13). The aorta tapers without aneurysm. There is no retroperitoneal adenopathy or  mass. The bowel is normal.  There is no ascites or free intraperitoneal air. The anteverted uterus. Normal left ovary. In the right adnexa, there is a  complex right adnexal 3.8 x 4.7 x 4.0 structure that is partially hypodense and  partly soft tissue density. Diffuse bladder wall thickening. Heterogeneous uterus. Marked thickening of the  vaginal cervical complex (axial image 53). Impression: Positive for right lower lobe pulmonary embolism without right heart strain or  pulmonary infarct    Right adnexal 4.7 cm structure could represent an exophytic subserosal fibroid  or ovarian mass. Transvaginal ultrasound was not able to differentiate. Cystitis    Esophagitis    Multiple ill-defined pulmonary nodules. Consider metastasis or an infectious or  inflammatory process. This result was called to Dr. Keyona Luis at [address] hours  1       CTA CHEST W OR W WO CONT    Result Date: 4/6/2023  Positive for right lower lobe pulmonary embolism without right heart strain or pulmonary infarct Right adnexal 4.7 cm structure could represent an exophytic subserosal fibroid or ovarian mass. Transvaginal ultrasound was not able to differentiate. Cystitis Esophagitis Multiple ill-defined pulmonary nodules. Consider metastasis or an infectious or inflammatory process.  This result was called to Dr. Keyona Luis at [address] hours 1     CT ABD PELV W CONT    Result Date: 4/6/2023  Positive for right lower lobe pulmonary embolism without right heart strain or pulmonary infarct Right adnexal 4.7 cm structure could represent an exophytic subserosal fibroid or ovarian mass. Transvaginal ultrasound was not able to differentiate. Cystitis Esophagitis Multiple ill-defined pulmonary nodules. Consider metastasis or an infectious or inflammatory process. This result was called to Dr. Corey Encarnacion at  [address] hours [address] [facility]    Result Date: 4/6/2023  Fibroid uterus Borderline thickened endometrium Nonvisualization of either ovary.  Nonvisualization of right adnexal structure                    Shaune Dubin, MD      [unfilled] 7:12 AM

## 2023-04-06 NOTE — CONSULTS
[facility]     Name: Belle Cali MRN: [id number]   : 1975 Hospital: [address]   Date: 2023        Impression Plan   Multiple nodules  Menorrhagia  Morbid obesity  Hx of PEs in                Suspect that the multiple nodules are secondary inflammation/infection from recent episode of bronchitis. CT chest in 2 months- will make it with contrast since question of residual clot seen on this one. Continue xarelto  Hematology following  Per Ob/GYN- Provera added  Follow up with Pulmonary in 2 weeks to set up future CT chest.   Signing off. Please page with any questions           Radiology  ( personally reviewed) CT chest reviewed: Scattered pulm nodules, some with tree-in-bud, some appear centrilobular. Inflammatory appearing   ABG No results for input(s): PHI, PO2I, PCO2I in the last 72 hours. Subjective     Cc: abnormal CT chest    50 yo with PMHx of PMHx of PE  on Xarelto presenting with menorrhagia. CTA chest revealed nodular findings and string like residual clot in PA. Pt denies any hx of lung problems. Had acute bronchitis sxs three weeks ago and finished up abx and steroids last week. She feels back to baseline. She has had CTAs several times before with no nodular findings per pt. Lifetime non-smoker    Review of Systems:  A comprehensive review of systems was negative except for that written in the HPI. Past Medical History:   Diagnosis Date    DM (diabetes mellitus) ([facility] [address]     Hx of deep venous thrombosis     Hx pulmonary embolism     Hypertension     Obese     PCOS (polycystic ovarian syndrome)       Past Surgical History:   Procedure Laterality Date    HX  SECTION  2003    HX HEENT Bilateral 1980    LAZY EYE    HX HEENT  [id number]    18 SETS OF EAR TUBES    HX LUMBAR DISKECTOMY      HX TONSILLECTOMY        Prior to Admission medications    Medication Sig Start Date End Date Taking?  Authorizing Provider medroxyPROGESTERone (PROVERA) 10 mg tablet Take 2 Tablets by mouth two (2) times a day for 30 days. 4/6/23 5/6/23 Yes Cindy Angel MD   ferrous gluconate 324 mg (38 mg iron) tablet Take 1 Tablet by mouth Daily (before breakfast) for 90 days. Can take any similar supplement 4/6/23 7/5/23 Yes Cindy Angel MD   rivaroxaban (Xarelto) 10 mg tablet Take  by mouth daily. Unknown dose   Yes Sara, MD Elise   spironolactone (ALDACTONE) 25 mg tablet Take  by mouth daily. Unknown dose   Yes Sara, MD Elise   lisinopriL (PRINIVIL, ZESTRIL) 10 mg tablet Take  by mouth daily. Unknown dose   Yes Sara, MD Elise   carvediloL (COREG) 12.5 mg tablet Take  by mouth two (2) times daily (with meals). Unknown dose   Yes Elise Ruiz MD   insulin NPH/insulin regular (NOVOLIN 70/30 U-100 INSULIN) 100 unit/mL (70-30) injection 35 Units by SubCUTAneous route two (2) times a day. Provider, Historical     Current Facility-Administered Medications   Medication Dose Route Frequency    sodium chloride (NS) flush 5-40 mL  5-40 mL IntraVENous Q8H    0.9% sodium chloride infusion  125 mL/hr IntraVENous CONTINUOUS    insulin glargine (LANTUS) injection 40 Units  40 Units SubCUTAneous DAILY    insulin lispro (HUMALOG) injection   SubCUTAneous Q6H    atenoloL (TENORMIN) tablet 25 mg  25 mg Oral DAILY    medroxyPROGESTERone (PROVERA) tablet 20 mg  20 mg Oral BID     Allergies   Allergen Reactions    Percocet [Oxycodone-Acetaminophen] Nausea and Vomiting      Social History     Tobacco Use    Smoking status: Never    Smokeless tobacco: Never   Substance Use Topics    Alcohol use: No      Family History   Problem Relation Age of Onset    Diabetes Mother     Heart Attack Mother     Stroke Mother     Heart Surgery Mother     OSTEOARTHRITIS Mother     Kidney Disease Father     Other Brother         STILLBORN    Anesth Problems Neg Hx           Laboratory: I have personally reviewed the critical care flowsheet and labs.      Recent Labs 04/06/23  0455 04/05/23  2300   WBC  --  6.7   HGB 7.9* 7.4*   HCT 27.0* 25.9*   PLT  --  278     Recent Labs     04/05/23  2300   *   K 4.8   CL 98   CO2 27   *   BUN 17   CREA 1.18*   CA 8.6   ALB 3.5   ALT 14   INR 2.0*       Objective:     Mode Rate Tidal Volume Pressure FiO2 PEEP                    Vital Signs:     TMAX(24)      Intake/Output:   Last shift:         Last 3 shifts: No intake/output data recorded. [id number]  Intake/Output Summary (Last 24 hours) at 4/6/2023 1241  Last data filed at 4/6/2023 0149  Gross per 24 hour   Intake 279 ml   Output --   Net 279 ml     EXAM:   GENERAL: awake, alert, on RA  HEENT:  PERRL, EOMI, no alar flaring or epistaxis, oral mucosa moist without cyanosis, NECK:  no jugular vein distention, no retractions, no thyromegaly or masses, LUNGS: CTA, no w/r/r HEART:  Regular rate and rhythm with no MGR; no edema is present, ABDOMEN:  soft with no tenderness, EXTREMITIES:  warm with no cyanosis, SKIN:  no jaundice or ecchymosis, and NEUROLOGIC:  alert and oriented, grossly non-focal    Kristal MD [facility]

## 2023-04-06 NOTE — H&P
Hospitalist Admission Note    NAME:  Gosia Finley   :  1975   MRN:  [id number]     Date/Time:  2023 5:12 AM    Patient PCP: Carin Castillo, DO  ________________________________________________________________________    Given the patient's current clinical presentation, I have a high level of concern for decompensation if discharged from the emergency department. Complex decision making was performed, which includes reviewing the patient's available past medical records, laboratory results, and x-ray films. My assessment of this patient's clinical condition and my plan of care is as follows. Assessment / Plan:    Severe Menorrhagia: The patient comes in w/ severe menorrhagia x6 months    VS -   WBC normal, Hg 7.4->7.9  INR 2  BUN 17, Cr 1.18  CT Abdomen - RLL PE w/o heart strain. 4.7cm fibroid   Pelvic US - 5.6x5. 2x4.5cm submucosal fibroid    Admit and cont to monitor. She has severe menorrhagia and at this time is open to myomectomy vs hysterectomy. Hold Eliquis for now and consult gynecology for surgical excision. If this is not done in this hospital I recommend to consider retrievable IVC filter w/ cessation of A/C until her fibroid w/ menorrhagia can be excised    2. PE:    She has a history of recurrent DVT and noted to have  PE on Chest CT    I recommend to hold A/C for now and consider myomectomy vs hysterectomy in current hospitalization. If this cannot be done at this time she would benefit from retrievable IVC filter w/ cessation of anticoagulation d/t her severe blood loss. 3.  HTN:    Cont w/ Aldactone 25mg daily, Coreg 12.5mg bid, Lisinopril 10mg daily    4. DM 2:    Consider Lantus 40 units sq qhs  ISS w/ BG checks    Body mass index is 39.16 kg/m².:  30.0 - 39.9:  Obese    I have personally reviewed the radiographs, laboratory data in Epic and decisions and statements above are based partially on this personal interpretation.     Code Status: Full Code    Prophylaxis:  SCD's     Subjective:   CHIEF COMPLAINT: Vaginal bleeding    HISTORY OF PRESENT ILLNESS:       The patient is a 48 y/o C F w/ PMH recurrent DVT/PE who comes in w/ severe menorrhagia which has been on-going for the past 6 months but has become severely worse in the past one month. She notes of severe gushing of blood w/ clots the size of her fists. She uses 8 pads per day and her flow is so forceful that it pushes her tampon out. Associated symptoms include fatigue, generalized weakness, exertional dyspnea and presyncope. She has not followed up w/ her gynecologist.  She has no fever, chills or night sweats. She has no chest pain, palpitations, wheezing or dyspnea. Past Medical History:   Diagnosis Date    Diabetes ([facility] [address]     Hypertension     Ill-defined condition     PCOS    PCOS (polycystic ovarian syndrome)     Thromboembolus ([facility] [address]     2017 superficial varicose veins      Past Surgical History:   Procedure Laterality Date    HX  SECTION  2003    HX HEENT Bilateral 1980    LAZY EYE    HX HEENT  [id number]    18 SETS OF EAR TUBES    HX LUMBAR DISKECTOMY  2005    HX TONSILLECTOMY  1978     Social History     Tobacco Use    Smoking status: Never    Smokeless tobacco: Never   Substance Use Topics    Alcohol use: No      Family History   Problem Relation Age of Onset    Diabetes Mother     Heart Attack Mother     Stroke Mother     Heart Surgery Mother     OSTEOARTHRITIS Mother     Kidney Disease Father     Other Brother         STILLBORN    Anesth Problems Neg Hx         Allergies   Allergen Reactions    Percocet [Oxycodone-Acetaminophen] Nausea and Vomiting        Prior to Admission medications    Medication Sig Start Date End Date Taking? Authorizing Provider   rivaroxaban (Xarelto) 10 mg tablet Take  by mouth daily. Unknown dose   Yes Other, MD Elise   spironolactone (ALDACTONE) 25 mg tablet Take  by mouth daily.  Unknown dose   Yes Other, MD Elise   lisinopriL (PRINIVIL, ZESTRIL) 10 mg tablet Take  by mouth daily. Unknown dose   Yes Other, MD Elise   carvediloL (COREG) 12.5 mg tablet Take  by mouth two (2) times daily (with meals). Unknown dose   Yes Other, MD Elise   insulin NPH/insulin regular (NOVOLIN 70/30 U-100 INSULIN) 100 unit/mL (70-30) injection 35 Units by SubCUTAneous route two (2) times a day.     Provider, Historical       REVIEW OF SYSTEMS:  See HPI for details  General: negative for fever, chills, sweats, +generalized weakness, weight loss  Eyes: negative for blurred vision, eye pain, loss of vision, diplopia  Ear Nose and Throat: negative for rhinorrhea, pharyngitis, otalgia, tinnitus, speech or swallowing difficulties  Respiratory:  negative for pleuritic pain, cough, sputum production, wheezing, +SOB, KAUFFMAN  Cardiology:  negative for chest pain, palpitations, orthopnea, PND, edema, syncope   Gastrointestinal: negative for abdominal pain, N/V, dysphagia, change in bowel habits, bleeding  Genitourinary: negative for frequency, urgency, dysuria, hematuria, incontinence  Muskuloskeletal : negative for arthralgia, myalgia  Hematology: negative for easy bruising, bleeding, lymphadenopathy  Dermatological: negative for rash, ulceration, mole change, new lesion  Endocrine: negative for hot flashes or polydipsia  Neurological: negative for headache, +dizziness, confusion, focal weakness, paresthesia, memory loss, gait disturbance  Psychological: negative for anxiety, depression, agitation      Objective:   VITALS:    Visit Vitals  /62 (BP 1 Location: Left upper arm, BP Patient Position: At rest)   Pulse (!) 114   Temp 99.2 °F (37.3 °C)   Resp 16   Ht 5' 7\" (1.702 m)   Wt 113.4 kg (250 lb)   SpO2 99%   BMI 39.16 kg/m²     PHYSICAL EXAM:    Physical Exam:    Gen: Well-developed, well-nourished, in no acute distress  HEENT:  Pink conjunctivae, PERRL, hearing intact to voice, moist mucous membranes  Neck: Supple, without masses, thyroid non-tender  Resp: No accessory muscle use, clear breath sounds without wheezes rales or rhonchi  Card: No murmurs, normal S1, S2 without thrills, bruits or peripheral edema  Abd:  Soft, non-tender, non-distended, normoactive bowel sounds are present, no palpable organomegaly and no detectable hernias  Lymph:  No cervical or inguinal adenopathy  Musc: No cyanosis or clubbing  Skin: No rashes or ulcers, skin turgor is good  Neuro:  Cranial nerves are grossly intact, no focal motor weakness, follows commands appropriately  Psych:  Good insight, oriented to person, place and time, alert  _______________________________________________________________________  Care Plan discussed with:  Pt's condition, Imaging findings, Lab findings, Assessment, and Care Plan discussed with: Patient  _______________________________________________________________________    Probable disposition:  Home  ________________________________________________________________________    Comments   >50% of visit spent in counseling and coordination of care  Chart reviewed  Discussion with patient and/or family and questions answered     ________________________________________________________________________  Signed: Hunter Castillo MD        Procedures: see electronic medical records for all procedures/Xrays and details which were not copied into this note but were reviewed prior to creation of Plan.     LAB DATA REVIEWED:    Recent Results (from the past 24 hour(s))   EKG, 12 LEAD, INITIAL    Collection Time: 04/05/23 10:55 PM   Result Value Ref Range    Ventricular Rate 116 BPM    Atrial Rate 116 BPM    P-R Interval 166 ms    QRS Duration 96 ms    Q-T Interval 324 ms    QTC Calculation (Bezet) 450 ms    Calculated P Axis 52 degrees    Calculated R Axis -39 degrees    Calculated T Axis 55 degrees    Diagnosis       Sinus tachycardia  Left axis deviation  Moderate voltage criteria for LVH, may be normal variant ( R in aVL , [place]   product )  Septal infarct (cited on or before 09-JUL-2019)  Abnormal ECG  When compared with ECG of 09-JUL-2019 10:32,  T wave inversion no longer evident in Inferior leads     CBC WITH AUTOMATED DIFF    Collection Time: 04/05/23 11:00 PM   Result Value Ref Range    WBC 6.7 3.6 - 11.0 K/uL    RBC 3.76 (L) 3.80 - 5.20 M/uL    HGB 7.4 (L) 11.5 - 16.0 g/dL    HCT 25.9 (L) 35.0 - 47.0 %    MCV 68.9 (L) 80.0 - 99.0 FL    MCH 19.7 (L) 26.0 - 34.0 PG    MCHC 28.6 (L) 30.0 - 36.5 g/dL    RDW 16.0 (H) 11.5 - 14.5 %    PLATELET 473 329 - 212 K/uL    MPV 11.1 8.9 - 12.9 FL    NRBC 0.0 0  WBC    ABSOLUTE NRBC 0.00 0.00 - 0.01 K/uL    NEUTROPHILS 63 32 - 75 %    LYMPHOCYTES 24 12 - 49 %    MONOCYTES 7 5 - 13 %    EOSINOPHILS 3 0 - 7 %    BASOPHILS 1 0 - 1 %    IMMATURE GRANULOCYTES 2 (H) 0.0 - 0.5 %    ABS. NEUTROPHILS 4.2 1.8 - 8.0 K/UL    ABS. LYMPHOCYTES 1.6 0.8 - 3.5 K/UL    ABS. MONOCYTES 0.5 0.0 - 1.0 K/UL    ABS. EOSINOPHILS 0.2 0.0 - 0.4 K/UL    ABS. BASOPHILS 0.1 0.0 - 0.1 K/UL    ABS. IMM. GRANS. 0.1 (H) 0.00 - 0.04 K/UL    DF SMEAR SCANNED      RBC COMMENTS MICROCYTOSIS  2+        RBC COMMENTS HYPOCHROMIA  1+        RBC COMMENTS OVALOCYTES  PRESENT       METABOLIC PANEL, COMPREHENSIVE    Collection Time: 04/05/23 11:00 PM   Result Value Ref Range    Sodium 135 (L) 136 - 145 mmol/L    Potassium 4.8 3.5 - 5.1 mmol/L    Chloride 98 98 - 107 mmol/L    CO2 27 22 - 29 mmol/L    Anion gap 10 5 - 15 mmol/L    Glucose 567 (H) 65 - 100 mg/dL    BUN 17 6 - 20 MG/DL    Creatinine 1.18 (H) 0.50 - 0.90 MG/DL    BUN/Creatinine ratio 14 12 - 20      eGFR 57 (L) >60 ml/min/1.73m2    Calcium 8.6 8.6 - 10.0 MG/DL    Bilirubin, total 0.2 0.2 - 1.0 MG/DL    ALT (SGPT) 14 10 - 35 U/L    AST (SGOT) 9 (L) 10 - 35 U/L    Alk.  phosphatase 107 (H) 35 - 104 U/L    Protein, total 5.6 (L) 6.4 - 8.3 g/dL    Albumin 3.5 3.5 - 5.2 g/dL    Globulin 2.1 2.0 - 4.0 g/dL    A-G Ratio 1.7 1.1 - 2.2     TYPE & SCREEN    Collection Time: 04/05/23 11:00 PM   Result Value Ref Range    Crossmatch Expiration 04/08/2023,235     ABO/Rh(D) O NEGATIVE     Antibody screen NEG     Unit number [id number]     Blood component type RC LR,2     Unit division 00     Status of unit ISSUED     Crossmatch result Compatible    PROTHROMBIN TIME + INR    Collection Time: 04/05/23 11:00 PM   Result Value Ref Range    INR 2.0 (H) 0.9 - 1.1      Prothrombin time 22.3 (H) 11.9 - 14.6 sec   BETA HCG, QT    Collection Time: 04/05/23 11:00 PM   Result Value Ref Range    Beta HCG, QT <1 <7 MIU/ML   RBC, ALLOCATE    Collection Time: 04/06/23 12:30 AM   Result Value Ref Range    HISTORY CHECKED?  Historical check performed    URINALYSIS W/ RFLX MICROSCOPIC    Collection Time: 04/06/23 12:45 AM   Result Value Ref Range    Color RED      Appearance TURBID (A) CLEAR      Specific gravity 1.010 1.003 - 1.030      pH (UA) 6.5 5.0 - 8.0      Protein 100 (A) NEG mg/dL    Glucose >1,000 (A) NEG mg/dL    Ketone Negative NEG mg/dL    Bilirubin Negative NEG      Blood LARGE (A) NEG      Urobilinogen 0.2 0.2 - 1.0 EU/dL    Nitrites Positive (A) NEG      Leukocyte Esterase Negative NEG     URINE MICROSCOPIC ONLY    Collection Time: 04/06/23 12:45 AM   Result Value Ref Range    WBC 0-4 0 - 4 /hpf    RBC >100 /hpf    Epithelial cells FEW FEW /lpf    Bacteria Negative NEG /hpf    Hyaline cast 0-2 (A) NEG /lpf   HGB & HCT    Collection Time: 04/06/23  4:55 AM   Result Value Ref Range    HGB 7.9 (L) 11.5 - 16.0 g/dL    HCT 27.0 (L) 35.0 - 47.0 %

## 2023-04-07 LAB
ABO + RH BLD: NORMAL
BLD PROD TYP BPU: NORMAL
BLOOD GROUP ANTIBODIES SERPL: NORMAL
BPU ID: NORMAL
CROSSMATCH RESULT,%XM: NORMAL
SPECIMEN EXP DATE BLD: NORMAL
STATUS OF UNIT,%ST: NORMAL
UNIT DIVISION, %UDIV: 0

## 2023-04-27 ENCOUNTER — HOSPITAL ENCOUNTER (EMERGENCY)
Age: 48
Discharge: HOME | End: 2023-04-27
Attending: EMERGENCY MEDICINE
Payer: COMMERCIAL

## 2023-04-27 VITALS
WEIGHT: 275 LBS | RESPIRATION RATE: 20 BRPM | DIASTOLIC BLOOD PRESSURE: 78 MMHG | SYSTOLIC BLOOD PRESSURE: 169 MMHG | OXYGEN SATURATION: 100 % | BODY MASS INDEX: 41.68 KG/M2 | TEMPERATURE: 98.5 F | HEART RATE: 106 BPM | HEIGHT: 68 IN

## 2023-04-27 DIAGNOSIS — L02.91 ABSCESS: Primary | ICD-10-CM

## 2023-04-27 PROCEDURE — 74011000250 HC RX REV CODE- 250

## 2023-04-27 PROCEDURE — 99283 EMERGENCY DEPT VISIT LOW MDM: CPT

## 2023-04-27 PROCEDURE — 75810000117 HC INC/DRN VULVA/PERINEUM

## 2023-04-27 RX ORDER — LIDOCAINE HYDROCHLORIDE 10 MG/ML
20 INJECTION INFILTRATION; PERINEURAL ONCE
Status: COMPLETED | OUTPATIENT
Start: 2023-04-27 | End: 2023-04-27

## 2023-04-27 RX ORDER — DOXYCYCLINE HYCLATE 100 MG
100 TABLET ORAL 2 TIMES DAILY
Qty: 20 TABLET | Refills: 0 | Status: SHIPPED | OUTPATIENT
Start: 2023-04-27 | End: 2023-05-07

## 2023-04-27 RX ADMIN — LIDOCAINE HYDROCHLORIDE 20 ML: 10 INJECTION, SOLUTION INFILTRATION; PERINEURAL at 17:05

## 2023-04-27 NOTE — DISCHARGE INSTRUCTIONS
Discussed visit today. Please continue warm compresses at home. You can start taking Sitz baths when are a few inches of water in the bathtub and adding epsom salt - this helps with drainage. Please call gynecology and follow-up before your may 16th appointment. Return to the ER with worsening symptoms.

## 2023-04-27 NOTE — ED TRIAGE NOTES
Pt to ER with c/o redness and pain to left outer breast, left thigh, and groin. Pt denies any drainage to any of the areas. Pt reports hx of same and sts symptoms resolved with a dose of keflex.

## 2023-04-27 NOTE — ED PROVIDER NOTES
Patient is a 68-year-old female with history of diabetes, DVT/PE, hypertension and obesity presents to the ER with reports of multiple areas of cellulitis started this week. She states there is an area on her left breast, left groin, left labia, posterior left leg under the gluteal area, right labia, right groin area. She denies any drainage, but states it is very painful. The patient states this is happened before and she had a dose of Keflex that treated in all. She states this time it all happened after she had an area infected on her fingernail and she thinks she touched other areas of her body causing them to become infected as well. She states she usually treats them with warm compresses which she has done this week but this time there is no relief. She states she has a large fibroid in her next appointment with her gynecologist is  to discussed her hysterectomy. Patient denies any chance of being pregnant. She denies alcohol use, smoking or vaping, or illicit drug use.       Abscess        Past Medical History:   Diagnosis Date    DM (diabetes mellitus) ([facility] [address]     Hx of deep venous thrombosis     Hx pulmonary embolism     Hypertension     Obese     PCOS (polycystic ovarian syndrome)        Past Surgical History:   Procedure Laterality Date    HX  SECTION  2003    HX HEENT Bilateral 1980    LAZY EYE    HX HEENT  2290-0647    18 SETS OF EAR TUBES    HX LUMBAR DISKECTOMY      HX TONSILLECTOMY           Family History:   Problem Relation Age of Onset    Diabetes Mother     Heart Attack Mother     Stroke Mother     Heart Surgery Mother     OSTEOARTHRITIS Mother     Kidney Disease Father     Other Brother         STILLBORN    Anesth Problems Neg Hx        Social History     Socioeconomic History    Marital status: SINGLE     Spouse name: Not on file    Number of children: Not on file    Years of education: Not on file    Highest education level: Not on file Occupational History    Not on file   Tobacco Use    Smoking status: Never    Smokeless tobacco: Never   Substance and Sexual Activity    Alcohol use: No    Drug use: Never    Sexual activity: Not on file   Other Topics Concern    Not on file   Social History Narrative    Not on file     Social Determinants of Health     Financial Resource Strain: Not on file   Food Insecurity: Not on file   Transportation Needs: Not on file   Physical Activity: Not on file   Stress: Not on file   Social Connections: Not on file   Intimate Partner Violence: Not on file   Housing Stability: Not on file         ALLERGIES: Percocet [oxycodone-acetaminophen]    Review of Systems   Constitutional:  Negative for activity change and fever. HENT:  Negative for congestion, rhinorrhea and sore throat. Eyes:  Negative for discharge. Respiratory:  Negative for chest tightness and shortness of breath. Cardiovascular:  Negative for chest pain. Gastrointestinal:  Negative for abdominal pain, constipation, diarrhea, nausea and vomiting. Genitourinary:  Negative for dysuria. Musculoskeletal:  Negative for myalgias. Skin:  Negative for pallor. Multiple abscess and redness of left breast   Neurological:  Negative for dizziness and headaches. Psychiatric/Behavioral:  Negative for agitation and behavioral problems. Vitals:    04/27/23 1540   BP: (!) 169/82   Pulse: (!) 106   Resp: 18   Temp: 98.5 °F (36.9 °C)   SpO2: 98%   Weight: 124.7 kg (275 lb)   Height: 5' 8\" (1.727 m)            Physical Exam  Vitals reviewed. Constitutional:       General: She is not in acute distress. Appearance: Normal appearance. She is not ill-appearing or toxic-appearing. HENT:      Head: Normocephalic and atraumatic. Nose: Nose normal.      Mouth/Throat:      Mouth: Mucous membranes are moist.      Pharynx: Oropharynx is clear. Eyes:      Extraocular Movements: Extraocular movements intact.       Conjunctiva/sclera: Conjunctivae normal.      Pupils: Pupils are equal, round, and reactive to light. Cardiovascular:      Rate and Rhythm: Normal rate and regular rhythm. Pulses: Normal pulses. Heart sounds: Normal heart sounds. Pulmonary:      Effort: Pulmonary effort is normal.      Breath sounds: Normal breath sounds. Abdominal:      General: Bowel sounds are normal.      Palpations: Abdomen is soft. Tenderness: There is no abdominal tenderness. Musculoskeletal:         General: No tenderness. Normal range of motion. Cervical back: Normal range of motion and neck supple. No tenderness. Skin:     General: Skin is warm. Capillary Refill: Capillary refill takes less than 2 seconds. Coloration: Skin is not pale. Comments: Lateral left breast erythematous and warm without drainage or fluctuance. Area of left posterior thigh under gluteal area with scabbed area and already drained. Left labia with large indurated abscess. Left groin area with enlarged lymph node. Right groin area with infection hair follicle/abscess already draining without erythema. Right labia with small area of induration without erythema or fluctuance. Neurological:      General: No focal deficit present. Mental Status: She is alert and oriented to person, place, and time. Motor: No weakness. Psychiatric:         Mood and Affect: Mood normal.         Behavior: Behavior normal.        Medical Decision Making  Patient is a 41-year-old female with history of diabetes, DVT/PE, hypertension and obesity presents to the ER with reports of multiple areas of cellulitis started this week. Ddx: cellulitis, abscess, lymphadenopathy, folliculitis. Physical examination shows  lateral left breast erythematous and warm without drainage or fluctuance. Area of left posterior thigh under gluteal area with scabbed area and already drained. Left labia with large indurated abscess. Left groin area with enlarged lymph node.  Right groin area with infection hair follicle/abscess already draining without erythema. Right labia with small area of induration without erythema or fluctuance. Patient offered I&D of left labia abscess and patient agrees at this time. Antibiotic sent to pharmacy. Patient average pulse while being in the ER today was 106. While chatting with her about this she states this is always an issue for her and she has chronic tachycardia. We discussed bag of fluids but she declines at this time. Based on past records, she has had multiple tachycardia readings. Patient is agreeable to plan. All questions answered. Return precautions provided and discharged home at this time. Presentation, management, and disposition were discussed with the attending physician, Dr. Raoul Rg, who is in agreement with plan of care. Problems Addressed:  Abscess: acute illness or injury    Amount and/or Complexity of Data Reviewed  External Data Reviewed: notes. Risk  Prescription drug management. I&D Abcess Simple    Date/Time: 4/27/2023 7:55 PM  Performed by: Dimitris Bonilla PA-C  Authorized by: Dimitris Bonilla PA-C     Consent:     Consent obtained:  Verbal    Consent given by:  Patient    Risks, benefits, and alternatives were discussed: yes      Risks discussed:  Bleeding, damage to other organs, infection, incomplete drainage and pain    Alternatives discussed:  Alternative treatment, observation, referral, delayed treatment and no treatment  Universal protocol:     Procedure explained and questions answered to patient or proxy's satisfaction: yes      Patient identity confirmed:  Verbally with patient  Location:     Type:  Abscess    Location:  Anogenital    Anogenital location: left labia.   Pre-procedure details:     Skin preparation:  Chlorhexidine with alcohol  Sedation:     Sedation type:  None  Anesthesia:     Anesthesia method:  Local infiltration    Local anesthetic:  Lidocaine 1% w/o epi  Procedure type:     Complexity: Simple  Procedure details:     Ultrasound guidance: no      Needle aspiration: no      Incision types:  Stab incision    Incision depth:  Dermal    Wound management:  Probed and deloculated, irrigated with saline and extensive cleaning    Drainage:  Bloody and purulent    Drainage amount:   Moderate    Wound treatment:  Wound left open    Packing materials:  None  Post-procedure details:     Procedure completion:  Tolerated well, no immediate complications

## 2023-05-06 PROBLEM — E86.0 DEHYDRATION: Status: RESOLVED | Noted: 2023-04-06 | Resolved: 2023-05-06

## 2023-05-26 RX ORDER — LISINOPRIL 10 MG/1
TABLET ORAL DAILY
COMMUNITY

## 2023-05-26 RX ORDER — DOXYCYCLINE HYCLATE 50 MG/1
1 CAPSULE, GELATIN COATED ORAL
Qty: 30 TABLET | Refills: 2 | COMMUNITY
Start: 2023-04-06 | End: 2023-07-05

## 2023-05-26 RX ORDER — CARVEDILOL 12.5 MG/1
TABLET ORAL 2 TIMES DAILY WITH MEALS
COMMUNITY

## 2023-12-05 ENCOUNTER — HOSPITAL ENCOUNTER (EMERGENCY)
Facility: HOSPITAL | Age: 48
Discharge: HOME | End: 2023-12-05
Attending: STUDENT IN AN ORGANIZED HEALTH CARE EDUCATION/TRAINING PROGRAM
Payer: COMMERCIAL

## 2023-12-05 VITALS
DIASTOLIC BLOOD PRESSURE: 114 MMHG | HEART RATE: 107 BPM | RESPIRATION RATE: 16 BRPM | OXYGEN SATURATION: 100 % | SYSTOLIC BLOOD PRESSURE: 173 MMHG | WEIGHT: 270 LBS | HEIGHT: 67 IN | TEMPERATURE: 98.4 F | BODY MASS INDEX: 42.38 KG/M2

## 2023-12-05 DIAGNOSIS — J06.9 VIRAL URI WITH COUGH: ICD-10-CM

## 2023-12-05 DIAGNOSIS — H60.392 INFECTIVE OTITIS EXTERNA OF LEFT EAR: Primary | ICD-10-CM

## 2023-12-05 PROCEDURE — 6370000000 HC RX 637 (ALT 250 FOR IP): Performed by: STUDENT IN AN ORGANIZED HEALTH CARE EDUCATION/TRAINING PROGRAM

## 2023-12-05 PROCEDURE — 99283 EMERGENCY DEPT VISIT LOW MDM: CPT

## 2023-12-05 RX ORDER — BENZONATATE 100 MG/1
100 CAPSULE ORAL 2 TIMES DAILY PRN
Qty: 14 CAPSULE | Refills: 0 | Status: SHIPPED | OUTPATIENT
Start: 2023-12-05 | End: 2023-12-12

## 2023-12-05 RX ORDER — ACETAMINOPHEN 325 MG/1
650 TABLET ORAL
Status: COMPLETED | OUTPATIENT
Start: 2023-12-05 | End: 2023-12-05

## 2023-12-05 RX ORDER — BENZONATATE 100 MG/1
100 CAPSULE ORAL 2 TIMES DAILY PRN
Qty: 14 CAPSULE | Refills: 0 | Status: SHIPPED | OUTPATIENT
Start: 2023-12-05 | End: 2023-12-05 | Stop reason: SDUPTHER

## 2023-12-05 RX ORDER — IBUPROFEN 400 MG/1
400 TABLET ORAL
Status: COMPLETED | OUTPATIENT
Start: 2023-12-05 | End: 2023-12-05

## 2023-12-05 RX ORDER — OFLOXACIN 3 MG/ML
5 SOLUTION AURICULAR (OTIC) 2 TIMES DAILY
Qty: 3.5 ML | Refills: 0 | Status: SHIPPED | OUTPATIENT
Start: 2023-12-05 | End: 2023-12-12

## 2023-12-05 RX ADMIN — IBUPROFEN 400 MG: 400 TABLET, FILM COATED ORAL at 23:26

## 2023-12-05 RX ADMIN — ACETAMINOPHEN 650 MG: 325 TABLET ORAL at 23:26

## 2023-12-05 ASSESSMENT — PAIN - FUNCTIONAL ASSESSMENT: PAIN_FUNCTIONAL_ASSESSMENT: 0-10

## 2023-12-05 ASSESSMENT — PAIN DESCRIPTION - LOCATION: LOCATION: EAR

## 2023-12-05 ASSESSMENT — PAIN DESCRIPTION - ORIENTATION: ORIENTATION: LEFT

## 2023-12-06 NOTE — ED TRIAGE NOTES
Patient with concern for left ear infection X1 week with worsening pain today, endorses yellow drainage from er and cough.  Patient believes she hs a sinus infection as well

## 2023-12-06 NOTE — ED NOTES
Pt given discharge instructions, patient education, prescriptions, and follow up information. Pt verbalizes understanding. All questions answered. Patient discharged to home in private vehicle, ambulatory. Pt A&Ox4, RA, pain controlled.       Liam Saxena RN  12/05/23 [id number]

## 2023-12-06 NOTE — DISCHARGE INSTRUCTIONS
~Keep ear canal dry  Abstain from water sports for 7-10 days  ~Avoid Q-tip or other foreign objects in the ear  ~Ofloxacin (Floxin otic): 5 drops in affected ear BID x 7 day   For pain management, both Tylenol and ibuprofen (motrin) can be taken. They have a different chemical composition and may give more relief together than can be provided using either alone. How to alternate Ibuprofen and Tylenol:  ? With food, You will take a dose of pain medication every three hours. ? Start by taking 650 mg of Tylenol   ? 3 hours later take 600 mg of Motrin   ? 3 hours after taking the Motrin take 650 mg of Tylenol  ? 3 hours after that take 600 mg of Motrin. ? You can continue to alternate Ibuprofen and Tylenol, up to the maximum doses of each medicine, but do not exceed the maximum dose of each. ? Be sure to maintain proper dosing intervals between successive doses of the same medication (at least 4 hours)    Do not take more than 4,000 mg of Tylenol or 3,200 mg of Motrin in a 24-hour period!

## 2023-12-06 NOTE — ED PROVIDER NOTES
[facility] EMERGENCY DEPT  EMERGENCY DEPARTMENT ENCOUNTER      Pt Name: Christine Jennings  MRN: [id number]  [address] [facility] 1975  Date of evaluation: 2023  Provider: Yee Stoner MD    CHIEF COMPLAINT       Chief Complaint   Patient presents with    Otalgia         HISTORY OF PRESENT ILLNESS   (Location/Symptom, Timing/Onset, Context/Setting, Quality, Duration, Modifying Factors, Severity)  Note limiting factors. HPI    58-year-old female with history of diabetes, hypertension, obesity, PCOS presenting for evaluation of ear pain. Patient reports 1 week of cough, postnasal drainage, green sputum production. Denies fevers at home. Reports no recent antibiotics. States that she has had 7 days of symptoms, and that she would like antibiotics for her sinus infection. Patient reports left-sided ear pain and concern for ear infection, states that she has a history of ear infections as a child. Reports no facial swelling, no nasal drainage, no visual changes    Review of External Medical Records:         Nursing Notes were reviewed. REVIEW OF SYSTEMS    (2-9 systems for level 4, 10 or more for level 5)     Except as noted above the remainder of the review of systems was reviewed and negative.        PAST MEDICAL HISTORY     Past Medical History:   Diagnosis Date    DM (diabetes mellitus) ([address])     Hx of deep venous thrombosis     Hx pulmonary embolism     Hypertension     Obese     PCOS (polycystic ovarian syndrome)          SURGICAL HISTORY       Past Surgical History:   Procedure Laterality Date     SECTION  2003    HEENT  [id number]    18 SETS OF EAR TUBES    HEENT Bilateral 1980    LAZY EYE    LUMBAR DISCECTOMY  2005    TONSILLECTOMY           CURRENT MEDICATIONS       Previous Medications    CARVEDILOL (COREG) 12.5 MG TABLET    Take by mouth 2 times daily (with meals)    FERROUS GLUCONATE (FERGON) 324 (38 FE) MG TABLET    Take 1 tablet by mouth every morning (before breakfast)

## 2023-12-06 NOTE — ED NOTES
Patient requested \" put in my chart that I dont want anything that has penicillin in it because it will give me a yeast infection and I'm having a hysterectomy in a week: Digna Morton RN  12/05/23 5453

## 2024-08-16 ENCOUNTER — HOSPITAL ENCOUNTER (EMERGENCY)
Facility: HOSPITAL | Age: 49
Discharge: HOME | End: 2024-08-16
Attending: STUDENT IN AN ORGANIZED HEALTH CARE EDUCATION/TRAINING PROGRAM
Payer: COMMERCIAL

## 2024-08-16 VITALS
HEIGHT: 67 IN | DIASTOLIC BLOOD PRESSURE: 76 MMHG | OXYGEN SATURATION: 98 % | TEMPERATURE: 98.7 F | HEART RATE: 96 BPM | RESPIRATION RATE: 17 BRPM | SYSTOLIC BLOOD PRESSURE: 139 MMHG | WEIGHT: 250 LBS | BODY MASS INDEX: 39.24 KG/M2

## 2024-08-16 DIAGNOSIS — H65.05 RECURRENT ACUTE SEROUS OTITIS MEDIA OF LEFT EAR: Primary | ICD-10-CM

## 2024-08-16 PROCEDURE — 99283 EMERGENCY DEPT VISIT LOW MDM: CPT

## 2024-08-16 RX ORDER — FLUCONAZOLE 150 MG/1
150 TABLET ORAL
Qty: 1 TABLET | Refills: 0 | Status: SHIPPED | OUTPATIENT
Start: 2024-08-16 | End: 2024-08-16

## 2024-08-16 RX ORDER — AMOXICILLIN 500 MG/1
500 CAPSULE ORAL 3 TIMES DAILY
Qty: 30 CAPSULE | Refills: 0 | Status: SHIPPED | OUTPATIENT
Start: 2024-08-16 | End: 2024-08-26

## 2024-08-16 RX ORDER — HYDROCODONE BITARTRATE AND ACETAMINOPHEN 5; 325 MG/1; MG/1
1 TABLET ORAL EVERY 8 HOURS PRN
Qty: 2 TABLET | Refills: 0 | Status: SHIPPED | OUTPATIENT
Start: 2024-08-16 | End: 2024-08-19

## 2024-08-16 ASSESSMENT — PAIN SCALES - GENERAL: PAINLEVEL_OUTOF10: 4

## 2024-08-16 ASSESSMENT — PAIN DESCRIPTION - LOCATION: LOCATION: EAR

## 2024-08-16 ASSESSMENT — PAIN - FUNCTIONAL ASSESSMENT: PAIN_FUNCTIONAL_ASSESSMENT: 0-10

## 2024-08-16 ASSESSMENT — PAIN DESCRIPTION - ORIENTATION: ORIENTATION: LEFT

## 2024-08-17 NOTE — ED PROVIDER NOTES
mouth dailyHistorical Med      rivaroxaban (XARELTO) 20 MG TABS tablet Take 1 tablet by mouth Daily with supperHistorical Med             ALLERGIES     Patient has no known allergies.    FAMILY HISTORY       Family History   Problem Relation Age of Onset    Heart Surgery Mother     Stroke Mother     Heart Attack Mother     Kidney Disease Father     Other Brother         STILLBORN    Osteoarthritis Mother     Diabetes Mother     Anesth Problems Neg Hx         SOCIAL HISTORY       Social History     Socioeconomic History    Marital status: Single     Spouse name: None    Number of children: None    Years of education: None    Highest education level: None   Tobacco Use    Smoking status: Never    Smokeless tobacco: Never   Substance and Sexual Activity    Alcohol use: No    Drug use: Never       PHYSICAL EXAM       ED Triage Vitals [08/16/24 1612]   BP Systolic BP Percentile Diastolic BP Percentile Temp Temp Source Pulse Respirations SpO2   139/76 -- -- 98.7 °F (37.1 °C) Oral 96 17 98 %      Height Weight - Scale         1.702 m (5' 7\") 113.4 kg (250 lb)           Physical Exam  Constitutional:       Appearance: Normal appearance.   HENT:      Head: Normocephalic and atraumatic.      Ears:      Comments: Mild erythema to the left tympanic membrane.  No other auricular or external auditory canal swelling.  No mastoid tenderness.  Mild trismus     Nose: Nose normal.      Mouth/Throat:      Mouth: Mucous membranes are moist.   Eyes:      General: No scleral icterus.     Extraocular Movements: Extraocular movements intact.   Cardiovascular:      Rate and Rhythm: Normal rate.      Pulses: Normal pulses.   Pulmonary:      Effort: Pulmonary effort is normal.      Breath sounds: Normal breath sounds.   Abdominal:      General: Abdomen is flat.   Musculoskeletal:         General: No deformity or signs of injury.      Cervical back: Normal range of motion and neck supple.   Skin:     General: Skin is warm.   Neurological:       General: No focal deficit present.      Mental Status: She is alert.   Psychiatric:         Mood and Affect: Mood normal.         DIAGNOSTIC RESULTS   EKG: If obtained, EKG interpretation provided in the ED course    RADIOLOGY:   No orders to display        LABS:  Labs Reviewed - No data to display    All other labs were within normal range or not returned as of this dictation.    EMERGENCY DEPARTMENT COURSE and DIFFERENTIAL DIAGNOSIS/MDM:   Vitals:    Vitals:    08/16/24 1612   BP: 139/76   Pulse: 96   Resp: 17   Temp: 98.7 °F (37.1 °C)   TempSrc: Oral   SpO2: 98%   Weight: 113.4 kg (250 lb)   Height: 1.702 m (5' 7\")       Medical Decision Making  49-year-old female presenting for left ear pain and swelling, drainage, and exam findings of tympanic membrane erythema.  Vitals normal, patient well-appearing.  No constitutional symptoms.  No meningismus.  No clinical evidence of deep space infection, meningitis or other emergency conditions.  There may be a mild otitis media, will treat with amoxicillin.  Give pain control, strict return precautions, ENT follow-up.  Discharge in stable condition.    Risk  Prescription drug management.        REASSESSMENT        CONSULTS:  None    PROCEDURES:  Procedures    FINAL IMPRESSION      1. Recurrent acute serous otitis media of left ear          DISPOSITION/PLAN   DISPOSITION Decision To Discharge 08/16/2024 04:42:56 PM    (Please note that portions of this note were completed with a voice recognition program.  Efforts were made to edit the dictations but occasionally words are mis-transcribed.)    Juanito Hester MD (electronically signed)  Emergency Attending Physician        Juanito Hester MD  08/16/24 2018

## 2024-12-01 ENCOUNTER — APPOINTMENT (OUTPATIENT)
Facility: HOSPITAL | Age: 49
End: 2024-12-01
Payer: COMMERCIAL

## 2024-12-01 ENCOUNTER — HOSPITAL ENCOUNTER (EMERGENCY)
Facility: HOSPITAL | Age: 49
Discharge: HOME | End: 2024-12-01
Attending: EMERGENCY MEDICINE
Payer: COMMERCIAL

## 2024-12-01 VITALS
RESPIRATION RATE: 18 BRPM | HEIGHT: 69 IN | DIASTOLIC BLOOD PRESSURE: 84 MMHG | OXYGEN SATURATION: 98 % | TEMPERATURE: 98.2 F | BODY MASS INDEX: 37.03 KG/M2 | SYSTOLIC BLOOD PRESSURE: 148 MMHG | HEART RATE: 98 BPM | WEIGHT: 250 LBS

## 2024-12-01 DIAGNOSIS — J06.9 VIRAL URI WITH COUGH: Primary | ICD-10-CM

## 2024-12-01 LAB
FLUAV RNA SPEC QL NAA+PROBE: NOT DETECTED
FLUBV RNA SPEC QL NAA+PROBE: NOT DETECTED
SARS-COV-2 RNA RESP QL NAA+PROBE: NOT DETECTED
SOURCE: NORMAL

## 2024-12-01 PROCEDURE — 99284 EMERGENCY DEPT VISIT MOD MDM: CPT

## 2024-12-01 PROCEDURE — 71046 X-RAY EXAM CHEST 2 VIEWS: CPT

## 2024-12-01 PROCEDURE — 87636 SARSCOV2 & INF A&B AMP PRB: CPT

## 2024-12-01 RX ORDER — BENZONATATE 100 MG/1
100 CAPSULE ORAL 3 TIMES DAILY PRN
Qty: 30 CAPSULE | Refills: 0 | Status: SHIPPED | OUTPATIENT
Start: 2024-12-01 | End: 2024-12-11

## 2024-12-01 ASSESSMENT — LIFESTYLE VARIABLES
HOW MANY STANDARD DRINKS CONTAINING ALCOHOL DO YOU HAVE ON A TYPICAL DAY: PATIENT DOES NOT DRINK
HOW OFTEN DO YOU HAVE A DRINK CONTAINING ALCOHOL: NEVER

## 2024-12-01 ASSESSMENT — PAIN SCALES - GENERAL: PAINLEVEL_OUTOF10: 3

## 2024-12-01 NOTE — ED PROVIDER NOTES
DIAGNOSIS/MDM:   Vitals:    Vitals:    12/01/24 1321 12/01/24 1415   BP: (!) 153/87 (!) 148/84   Pulse: (!) 106 98   Resp: 18 18   Temp: 98.2 °F (36.8 °C)    TempSrc: Oral    SpO2: 98% 98%   Weight: 113.4 kg (250 lb)    Height: 1.753 m (5' 9\")          Medical Decision Making  Amount and/or Complexity of Data Reviewed  Radiology: ordered.    Risk  Prescription drug management.      49-year-old female presents with viral URI symptoms x 2 to 3 days.  Recent sick contacts with identical symptoms.  Afebrile with vital signs stable in no acute distress satting 98% on room air.    CXR viewed by myself and read by radiology showing no acute abnormalities.  Rapid COVID and influenza swabs negative.  Strongly suspect viral etiology for symptoms and recommended OTC cold relievers and Tessalon Perles for symptomatic relief and PCP follow-up.  Return precautions were given for worsening or concerns.      REASSESSMENT          CONSULTS:  None    PROCEDURES:     Procedures            (Please note that portions of this note were completed with a voice recognition program.  Efforts were made to edit the dictations but occasionally words are mis-transcribed.)    Arturo Le DO (electronically signed)  Emergency Attending Physician              Arturo Le DO  12/04/24 [id number]

## 2025-05-07 ENCOUNTER — HOSPITAL ENCOUNTER (EMERGENCY)
Facility: HOSPITAL | Age: 50
Discharge: HOME | End: 2025-05-07
Attending: EMERGENCY MEDICINE
Payer: COMMERCIAL

## 2025-05-07 VITALS
OXYGEN SATURATION: 100 % | HEIGHT: 67 IN | BODY MASS INDEX: 39.24 KG/M2 | RESPIRATION RATE: 16 BRPM | DIASTOLIC BLOOD PRESSURE: 78 MMHG | SYSTOLIC BLOOD PRESSURE: 155 MMHG | WEIGHT: 250 LBS | HEART RATE: 98 BPM | TEMPERATURE: 98.1 F

## 2025-05-07 DIAGNOSIS — H60.392 INFECTIVE OTITIS EXTERNA OF LEFT EAR: ICD-10-CM

## 2025-05-07 DIAGNOSIS — H65.192 ACUTE MUCOID OTITIS MEDIA OF LEFT EAR: Primary | ICD-10-CM

## 2025-05-07 PROCEDURE — 99283 EMERGENCY DEPT VISIT LOW MDM: CPT

## 2025-05-07 PROCEDURE — 6370000000 HC RX 637 (ALT 250 FOR IP): Performed by: PHYSICIAN ASSISTANT

## 2025-05-07 RX ORDER — CEFDINIR 300 MG/1
300 CAPSULE ORAL 2 TIMES DAILY
Qty: 14 CAPSULE | Refills: 0 | Status: SHIPPED | OUTPATIENT
Start: 2025-05-07 | End: 2025-05-14

## 2025-05-07 RX ORDER — IBUPROFEN 600 MG/1
600 TABLET, FILM COATED ORAL
Status: COMPLETED | OUTPATIENT
Start: 2025-05-07 | End: 2025-05-07

## 2025-05-07 RX ORDER — OFLOXACIN 3 MG/ML
5 SOLUTION AURICULAR (OTIC) 2 TIMES DAILY
Qty: 3.5 ML | Refills: 0 | Status: SHIPPED | OUTPATIENT
Start: 2025-05-07 | End: 2025-05-14

## 2025-05-07 RX ADMIN — IBUPROFEN 600 MG: 600 TABLET, FILM COATED ORAL at 17:42

## 2025-05-07 ASSESSMENT — PAIN DESCRIPTION - ORIENTATION: ORIENTATION: LEFT

## 2025-05-07 ASSESSMENT — LIFESTYLE VARIABLES
HOW OFTEN DO YOU HAVE A DRINK CONTAINING ALCOHOL: NEVER
HOW MANY STANDARD DRINKS CONTAINING ALCOHOL DO YOU HAVE ON A TYPICAL DAY: PATIENT DOES NOT DRINK

## 2025-05-07 ASSESSMENT — PAIN SCALES - GENERAL: PAINLEVEL_OUTOF10: 6

## 2025-05-07 ASSESSMENT — PAIN - FUNCTIONAL ASSESSMENT: PAIN_FUNCTIONAL_ASSESSMENT: 0-10

## 2025-05-07 ASSESSMENT — PAIN DESCRIPTION - DESCRIPTORS: DESCRIPTORS: ACHING

## 2025-05-07 ASSESSMENT — PAIN DESCRIPTION - PAIN TYPE: TYPE: ACUTE PAIN

## 2025-05-07 ASSESSMENT — PAIN DESCRIPTION - LOCATION: LOCATION: EAR

## 2025-05-07 ASSESSMENT — PAIN DESCRIPTION - FREQUENCY: FREQUENCY: CONTINUOUS

## 2025-05-07 NOTE — ED PROVIDER NOTES
[facility] EMERGENCY DEPARTMENT  EMERGENCY DEPARTMENT ENCOUNTER      Pt Name: Zuleima Lane  MRN: [id number]  Birthdate 1975  Date of evaluation: 2025  Provider: Herbie Santa PA-C    CHIEF COMPLAINT       Chief Complaint   Patient presents with    Ear Pain         HISTORY OF PRESENT ILLNESS   (Location/Symptom, Timing/Onset, Context/Setting, Quality, Duration, Modifying Factors, Severity)  Note limiting factors.   Zuleima Lane is a 49 y.o. female presenting to ED for left ear infection x 2 days that has blood and pus come out of it today.  Radiation into left jaw. Pt reports history of recurrent ear infections.  Ear tubes in the past.  Not on Abx recently.  Not followed by ENT at this time. Wishes to not take Amoxicillin due to causing yeast infections in the past. Reports that she does not do well with drops and would prefer oral medication.             Review of External Medical Records:     Nursing Notes were reviewed.    REVIEW OF SYSTEMS    (2-9 systems for level 4, 10 or more for level 5)       Except as noted above the remainder of the review of systems was reviewed and negative.       PAST MEDICAL HISTORY     Past Medical History:   Diagnosis Date    DM (diabetes mellitus) ([facility])     Hx of deep venous thrombosis     Hx pulmonary embolism     Hypertension     Obese     PCOS (polycystic ovarian syndrome)          SURGICAL HISTORY       Past Surgical History:   Procedure Laterality Date     SECTION   2004    HEENT  [id number]    18 SETS OF EAR TUBES    HEENT Bilateral 1980    LAZY EYE    LUMBAR DISCECTOMY  2005    TONSILLECTOMY           CURRENT MEDICATIONS       Previous Medications    CARVEDILOL (COREG) 12.5 MG TABLET    Take by mouth 2 times daily (with meals)    FERROUS GLUCONATE (FERGON) 324 (38 FE) MG TABLET    Take 1 tablet by mouth every morning (before breakfast)    INSULIN 70-30 (HUMULIN;NOVOLIN) (70-30) 100 UNIT PER ML INJECTION VIAL    Inject 35 Units

## 2025-05-07 NOTE — ED TRIAGE NOTES
Pt ambulatory into ER with cc of severe left ear pain x 2 days. Pt states, \"it sounds like there is a washing machine in my head and there is blood and pus coming out of it.\" Pt reports hx of 18 sets of ear tubes. Pt denies having a current ENT MD.     Pt denies use of OTC medication PTA. Pt reports radiation to left ear pain to jaw.

## (undated) LAB
APPEARANCE UR: ABNORMAL
BACTERIA URNS QL MICRO: NEGATIVE /HPF
BILIRUB UR QL: NEGATIVE
COLOR UR: ABNORMAL
COMMENT, HOLDF: (no result)
EPITH CASTS URNS QL MICRO: (no result) /LPF
ETHANOL SERPL-MCNC: <10 MG/DL
FERRITIN SERPL-MCNC: 4 NG/ML (ref 8–252)
FOLATE SERPL-MCNC: 11.5 NG/ML (ref 5–21)
GLUCOSE BLD STRIP.AUTO-MCNC: 301 MG/DL (ref 65–117)
GLUCOSE BLD STRIP.AUTO-MCNC: 414 MG/DL (ref 65–117)
GLUCOSE UR STRIP.AUTO-MCNC: >1000 MG/DL
HAPTOGLOB SERPL-MCNC: 236 MG/DL (ref 30–200)
HCT VFR BLD AUTO: 26 % (ref 35–47)
HCT VFR BLD AUTO: 27 % (ref 35–47)
HGB BLD-MCNC: 7.7 G/DL (ref 11.5–16)
HGB BLD-MCNC: 7.9 G/DL (ref 11.5–16)
HGB UR QL STRIP: ABNORMAL
HISTORY CHECKED?,CKHIST: (no result)
HYALINE CASTS URNS QL MICRO: (no result) /LPF
IRON SATN MFR SERPL: 4 % (ref 20–50)
IRON SERPL-MCNC: 17 UG/DL (ref 35–150)
KETONES UR QL STRIP.AUTO: NEGATIVE MG/DL
LDH SERPL L TO P-CCNC: 187 U/L (ref 81–246)
LEUKOCYTE ESTERASE UR QL STRIP.AUTO: NEGATIVE
NITRITE UR QL STRIP.AUTO: POSITIVE
PH UR STRIP: 6.5 (ref 5–8)
PROT UR STRIP-MCNC: 100 MG/DL
RBC #/AREA URNS HPF: >100 /HPF
RETICS # AUTO: 0.09 M/UL (ref 0.02–0.08)
RETICS/RBC NFR AUTO: 2.3 % (ref 0.7–2.1)
SAMPLES BEING HELD,HOLD: (no result)
SERVICE CMNT-IMP: (no result)
SERVICE CMNT-IMP: (no result)
SP GR UR REFRACTOMETRY: 1.01 (ref 1–1.03)
TIBC SERPL-MCNC: 441 UG/DL (ref 250–450)
TSH SERPL DL<=0.05 MIU/L-ACNC: 3.35 UIU/ML (ref 0.36–3.74)
UROBILINOGEN UR QL STRIP.AUTO: 0.2 EU/DL (ref 0.2–1)
VIT B12 SERPL-MCNC: 387 PG/ML (ref 193–986)
WBC URNS QL MICRO: (no result) /HPF (ref 0–4)

## (undated) DEVICE — CODMAN® SURGICAL PATTIES 1" X 3" (2.54CM X 7.62CM): Brand: CODMAN®

## (undated) DEVICE — KENDALL SCD EXPRESS SLEEVES, KNEE LENGTH, MEDIUM: Brand: KENDALL SCD

## (undated) DEVICE — SUTURE CHROMIC GUT SZ 3-0 L27IN ABSRB BRN L19MM FS-2 3/8 636H

## (undated) DEVICE — HANDLE LT SNAP ON ULT DURABLE LENS FOR TRUMPF ALC DISPOSABLE

## (undated) DEVICE — 1200 GUARD II KIT W/5MM TUBE W/O VAC TUBE: Brand: GUARDIAN

## (undated) DEVICE — COVER,MAYO STAND,STERILE: Brand: MEDLINE

## (undated) DEVICE — SOLUTION IV 1000ML 0.9% SOD CHL

## (undated) DEVICE — PACK,EENT,TURBAN DRAPE,PK II: Brand: MEDLINE

## (undated) DEVICE — STERILE POLYISOPRENE POWDER-FREE SURGICAL GLOVES: Brand: PROTEXIS

## (undated) DEVICE — SYR LR LCK 1ML GRAD NSAF 30ML --

## (undated) DEVICE — SYR 10ML LUER LOK 1/5ML GRAD --

## (undated) DEVICE — TOWEL SURG W17XL27IN STD BLU COT NONFENESTRATED PREWASHED

## (undated) DEVICE — MEDI-VAC NON-CONDUCTIVE SUCTION TUBING: Brand: CARDINAL HEALTH

## (undated) DEVICE — SOL ANTI-FOG 6ML MEDC -- MEDICHOICE - CONVERT TO 358427

## (undated) DEVICE — FIRM 4CM: Brand: NASOPORE

## (undated) DEVICE — GAUZE,SPONGE,2"X2",8PLY,STERILE,LF,2'S: Brand: MEDLINE

## (undated) DEVICE — INFECTION CONTROL KIT SYS

## (undated) DEVICE — SURGICAL PROCEDURE PACK BASIN MAJ SET CUST NO CAUT

## (undated) DEVICE — SOLUTION IV 500ML 0.9% SOD CHL FLX CONT